# Patient Record
Sex: FEMALE | Race: WHITE | Employment: OTHER | URBAN - METROPOLITAN AREA
[De-identification: names, ages, dates, MRNs, and addresses within clinical notes are randomized per-mention and may not be internally consistent; named-entity substitution may affect disease eponyms.]

---

## 2017-04-18 ENCOUNTER — HOSPITAL ENCOUNTER (OUTPATIENT)
Dept: CT IMAGING | Age: 82
Discharge: HOME | End: 2017-04-18
Attending: INTERNAL MEDICINE | Admitting: INTERNAL MEDICINE

## 2017-04-18 DIAGNOSIS — F02.80 ALZHEIMER'S DISEASE OF OTHER ONSET: ICD-10-CM

## 2017-04-18 DIAGNOSIS — F02.818 DEMENTIA OF THE ALZHEIMER'S TYPE, WITH LATE ONSET, WITH DELUSIONS (HCC): ICD-10-CM

## 2017-04-18 DIAGNOSIS — G30.9 ALZHEIMER'S DISEASE (HCC): ICD-10-CM

## 2017-04-18 DIAGNOSIS — G30.8 ALZHEIMER'S DISEASE OF OTHER ONSET: ICD-10-CM

## 2017-04-18 DIAGNOSIS — G30.1 DEMENTIA OF THE ALZHEIMER'S TYPE, WITH LATE ONSET, WITH DELUSIONS (HCC): ICD-10-CM

## 2017-04-18 DIAGNOSIS — F02.80 ALZHEIMER'S DISEASE (HCC): ICD-10-CM

## 2019-06-25 ENCOUNTER — ANESTHESIA EVENT (OUTPATIENT)
Dept: OPERATING ROOM | Age: 84
DRG: 903 | End: 2019-06-25
Payer: MEDICARE

## 2019-06-25 ENCOUNTER — TELEPHONE (OUTPATIENT)
Dept: ORTHOPEDIC SURGERY | Age: 84
End: 2019-06-25

## 2019-06-25 ENCOUNTER — APPOINTMENT (OUTPATIENT)
Dept: GENERAL RADIOLOGY | Age: 84
DRG: 903 | End: 2019-06-25
Payer: MEDICARE

## 2019-06-25 ENCOUNTER — ANESTHESIA (OUTPATIENT)
Dept: OPERATING ROOM | Age: 84
DRG: 903 | End: 2019-06-25
Payer: MEDICARE

## 2019-06-25 ENCOUNTER — HOSPITAL ENCOUNTER (INPATIENT)
Age: 84
LOS: 2 days | Discharge: NURSING, REHAB OR LONG-TERM CARE | DRG: 903 | End: 2019-06-27
Attending: INTERNAL MEDICINE | Admitting: INTERNAL MEDICINE
Payer: MEDICARE

## 2019-06-25 VITALS
DIASTOLIC BLOOD PRESSURE: 63 MMHG | SYSTOLIC BLOOD PRESSURE: 114 MMHG | TEMPERATURE: 98.6 F | OXYGEN SATURATION: 100 % | RESPIRATION RATE: 13 BRPM

## 2019-06-25 DIAGNOSIS — S81.012A KNEE LACERATION, LEFT, INITIAL ENCOUNTER: Primary | ICD-10-CM

## 2019-06-25 PROBLEM — S81.002A OPEN KNEE WOUND, LEFT, INITIAL ENCOUNTER: Status: ACTIVE | Noted: 2019-06-25

## 2019-06-25 LAB
ANION GAP SERPL CALCULATED.3IONS-SCNC: 15 MMOL/L (ref 3–16)
APTT: 28.3 SEC (ref 26–36)
BASOPHILS ABSOLUTE: 0.1 K/UL (ref 0–0.2)
BASOPHILS RELATIVE PERCENT: 0.7 %
BUN BLDV-MCNC: 15 MG/DL (ref 7–20)
CALCIUM SERPL-MCNC: 9.5 MG/DL (ref 8.3–10.6)
CHLORIDE BLD-SCNC: 105 MMOL/L (ref 99–110)
CO2: 22 MMOL/L (ref 21–32)
CREAT SERPL-MCNC: 1 MG/DL (ref 0.6–1.2)
EKG ATRIAL RATE: 88 BPM
EKG DIAGNOSIS: NORMAL
EKG P AXIS: 43 DEGREES
EKG P-R INTERVAL: 140 MS
EKG Q-T INTERVAL: 370 MS
EKG QRS DURATION: 68 MS
EKG QTC CALCULATION (BAZETT): 447 MS
EKG R AXIS: -26 DEGREES
EKG T AXIS: 33 DEGREES
EKG VENTRICULAR RATE: 88 BPM
EOSINOPHILS ABSOLUTE: 0.1 K/UL (ref 0–0.6)
EOSINOPHILS RELATIVE PERCENT: 1.8 %
GFR AFRICAN AMERICAN: >60
GFR NON-AFRICAN AMERICAN: 52
GLUCOSE BLD-MCNC: 106 MG/DL (ref 70–99)
HCT VFR BLD CALC: 40.6 % (ref 36–48)
HEMOGLOBIN: 13.5 G/DL (ref 12–16)
INR BLD: 0.97 (ref 0.86–1.14)
LYMPHOCYTES ABSOLUTE: 1.2 K/UL (ref 1–5.1)
LYMPHOCYTES RELATIVE PERCENT: 15.6 %
MCH RBC QN AUTO: 30.3 PG (ref 26–34)
MCHC RBC AUTO-ENTMCNC: 33.1 G/DL (ref 31–36)
MCV RBC AUTO: 91.6 FL (ref 80–100)
MONOCYTES ABSOLUTE: 0.5 K/UL (ref 0–1.3)
MONOCYTES RELATIVE PERCENT: 6.6 %
NEUTROPHILS ABSOLUTE: 5.9 K/UL (ref 1.7–7.7)
NEUTROPHILS RELATIVE PERCENT: 75.3 %
PDW BLD-RTO: 15.1 % (ref 12.4–15.4)
PLATELET # BLD: 193 K/UL (ref 135–450)
PMV BLD AUTO: 8.7 FL (ref 5–10.5)
POTASSIUM REFLEX MAGNESIUM: 4.4 MMOL/L (ref 3.5–5.1)
PROTHROMBIN TIME: 11.1 SEC (ref 9.8–13)
RBC # BLD: 4.44 M/UL (ref 4–5.2)
SODIUM BLD-SCNC: 142 MMOL/L (ref 136–145)
WBC # BLD: 7.8 K/UL (ref 4–11)

## 2019-06-25 PROCEDURE — 99221 1ST HOSP IP/OBS SF/LOW 40: CPT | Performed by: NURSE PRACTITIONER

## 2019-06-25 PROCEDURE — 85730 THROMBOPLASTIN TIME PARTIAL: CPT

## 2019-06-25 PROCEDURE — 85610 PROTHROMBIN TIME: CPT

## 2019-06-25 PROCEDURE — 6360000002 HC RX W HCPCS: Performed by: NURSE ANESTHETIST, CERTIFIED REGISTERED

## 2019-06-25 PROCEDURE — 2709999900 HC NON-CHARGEABLE SUPPLY: Performed by: ORTHOPAEDIC SURGERY

## 2019-06-25 PROCEDURE — 2500000003 HC RX 250 WO HCPCS: Performed by: NURSE ANESTHETIST, CERTIFIED REGISTERED

## 2019-06-25 PROCEDURE — 2580000003 HC RX 258: Performed by: NURSE ANESTHETIST, CERTIFIED REGISTERED

## 2019-06-25 PROCEDURE — 13122 CMPLX RPR S/A/L ADDL 5 CM/>: CPT | Performed by: ORTHOPAEDIC SURGERY

## 2019-06-25 PROCEDURE — 2580000003 HC RX 258: Performed by: INTERNAL MEDICINE

## 2019-06-25 PROCEDURE — 7100000001 HC PACU RECOVERY - ADDTL 15 MIN: Performed by: ORTHOPAEDIC SURGERY

## 2019-06-25 PROCEDURE — 6360000002 HC RX W HCPCS: Performed by: NURSE PRACTITIONER

## 2019-06-25 PROCEDURE — 3700000001 HC ADD 15 MINUTES (ANESTHESIA): Performed by: ORTHOPAEDIC SURGERY

## 2019-06-25 PROCEDURE — 99285 EMERGENCY DEPT VISIT HI MDM: CPT

## 2019-06-25 PROCEDURE — 0JBP0ZZ EXCISION OF LEFT LOWER LEG SUBCUTANEOUS TISSUE AND FASCIA, OPEN APPROACH: ICD-10-PCS | Performed by: INTERNAL MEDICINE

## 2019-06-25 PROCEDURE — 90471 IMMUNIZATION ADMIN: CPT | Performed by: PHYSICIAN ASSISTANT

## 2019-06-25 PROCEDURE — 2500000003 HC RX 250 WO HCPCS: Performed by: ORTHOPAEDIC SURGERY

## 2019-06-25 PROCEDURE — 6360000002 HC RX W HCPCS: Performed by: PHYSICIAN ASSISTANT

## 2019-06-25 PROCEDURE — 7100000000 HC PACU RECOVERY - FIRST 15 MIN: Performed by: ORTHOPAEDIC SURGERY

## 2019-06-25 PROCEDURE — 1200000000 HC SEMI PRIVATE

## 2019-06-25 PROCEDURE — 13121 CMPLX RPR S/A/L 2.6-7.5 CM: CPT | Performed by: ORTHOPAEDIC SURGERY

## 2019-06-25 PROCEDURE — 73560 X-RAY EXAM OF KNEE 1 OR 2: CPT

## 2019-06-25 PROCEDURE — 6360000002 HC RX W HCPCS: Performed by: ORTHOPAEDIC SURGERY

## 2019-06-25 PROCEDURE — 2580000003 HC RX 258: Performed by: ORTHOPAEDIC SURGERY

## 2019-06-25 PROCEDURE — 90715 TDAP VACCINE 7 YRS/> IM: CPT | Performed by: PHYSICIAN ASSISTANT

## 2019-06-25 PROCEDURE — 6370000000 HC RX 637 (ALT 250 FOR IP): Performed by: INTERNAL MEDICINE

## 2019-06-25 PROCEDURE — 96374 THER/PROPH/DIAG INJ IV PUSH: CPT

## 2019-06-25 PROCEDURE — 6370000000 HC RX 637 (ALT 250 FOR IP): Performed by: ORTHOPAEDIC SURGERY

## 2019-06-25 PROCEDURE — 93010 ELECTROCARDIOGRAM REPORT: CPT | Performed by: INTERNAL MEDICINE

## 2019-06-25 PROCEDURE — 0JCP0ZZ EXTIRPATION OF MATTER FROM LEFT LOWER LEG SUBCUTANEOUS TISSUE AND FASCIA, OPEN APPROACH: ICD-10-PCS | Performed by: INTERNAL MEDICINE

## 2019-06-25 PROCEDURE — 93005 ELECTROCARDIOGRAM TRACING: CPT | Performed by: PHYSICIAN ASSISTANT

## 2019-06-25 PROCEDURE — 3600000003 HC SURGERY LEVEL 3 BASE: Performed by: ORTHOPAEDIC SURGERY

## 2019-06-25 PROCEDURE — 3600000013 HC SURGERY LEVEL 3 ADDTL 15MIN: Performed by: ORTHOPAEDIC SURGERY

## 2019-06-25 PROCEDURE — 85025 COMPLETE CBC W/AUTO DIFF WBC: CPT

## 2019-06-25 PROCEDURE — 80048 BASIC METABOLIC PNL TOTAL CA: CPT

## 2019-06-25 PROCEDURE — 0JQP0ZZ REPAIR LEFT LOWER LEG SUBCUTANEOUS TISSUE AND FASCIA, OPEN APPROACH: ICD-10-PCS | Performed by: INTERNAL MEDICINE

## 2019-06-25 PROCEDURE — 3700000000 HC ANESTHESIA ATTENDED CARE: Performed by: ORTHOPAEDIC SURGERY

## 2019-06-25 RX ORDER — CALCIUM CARBONATE 500(1250)
500 TABLET ORAL DAILY
COMMUNITY
End: 2022-03-09

## 2019-06-25 RX ORDER — SODIUM CHLORIDE, SODIUM LACTATE, POTASSIUM CHLORIDE, CALCIUM CHLORIDE 600; 310; 30; 20 MG/100ML; MG/100ML; MG/100ML; MG/100ML
INJECTION, SOLUTION INTRAVENOUS CONTINUOUS
Status: DISCONTINUED | OUTPATIENT
Start: 2019-06-25 | End: 2019-06-25

## 2019-06-25 RX ORDER — FENTANYL CITRATE 50 UG/ML
25 INJECTION, SOLUTION INTRAMUSCULAR; INTRAVENOUS EVERY 5 MIN PRN
Status: DISCONTINUED | OUTPATIENT
Start: 2019-06-25 | End: 2019-06-25 | Stop reason: HOSPADM

## 2019-06-25 RX ORDER — MORPHINE SULFATE 2 MG/ML
1 INJECTION, SOLUTION INTRAMUSCULAR; INTRAVENOUS
Status: DISCONTINUED | OUTPATIENT
Start: 2019-06-25 | End: 2019-06-27 | Stop reason: HOSPADM

## 2019-06-25 RX ORDER — AMLODIPINE BESYLATE 5 MG/1
2.5 TABLET ORAL NIGHTLY
Status: DISCONTINUED | OUTPATIENT
Start: 2019-06-25 | End: 2019-06-27 | Stop reason: HOSPADM

## 2019-06-25 RX ORDER — BUPIVACAINE HYDROCHLORIDE 5 MG/ML
INJECTION, SOLUTION EPIDURAL; INTRACAUDAL
Status: COMPLETED | OUTPATIENT
Start: 2019-06-25 | End: 2019-06-25

## 2019-06-25 RX ORDER — SODIUM CHLORIDE 0.9 % (FLUSH) 0.9 %
10 SYRINGE (ML) INJECTION PRN
Status: DISCONTINUED | OUTPATIENT
Start: 2019-06-25 | End: 2019-06-27 | Stop reason: HOSPADM

## 2019-06-25 RX ORDER — OXYCODONE HYDROCHLORIDE AND ACETAMINOPHEN 5; 325 MG/1; MG/1
1 TABLET ORAL PRN
Status: DISCONTINUED | OUTPATIENT
Start: 2019-06-25 | End: 2019-06-25 | Stop reason: HOSPADM

## 2019-06-25 RX ORDER — ONDANSETRON 2 MG/ML
4 INJECTION INTRAMUSCULAR; INTRAVENOUS EVERY 6 HOURS PRN
Status: DISCONTINUED | OUTPATIENT
Start: 2019-06-25 | End: 2019-06-27 | Stop reason: HOSPADM

## 2019-06-25 RX ORDER — SODIUM CHLORIDE 0.9 % (FLUSH) 0.9 %
10 SYRINGE (ML) INJECTION EVERY 12 HOURS SCHEDULED
Status: DISCONTINUED | OUTPATIENT
Start: 2019-06-25 | End: 2019-06-25

## 2019-06-25 RX ORDER — LISINOPRIL 10 MG/1
10 TABLET ORAL DAILY
Status: DISCONTINUED | OUTPATIENT
Start: 2019-06-25 | End: 2019-06-26

## 2019-06-25 RX ORDER — SODIUM CHLORIDE 0.9 % (FLUSH) 0.9 %
10 SYRINGE (ML) INJECTION EVERY 12 HOURS SCHEDULED
Status: DISCONTINUED | OUTPATIENT
Start: 2019-06-25 | End: 2019-06-27 | Stop reason: HOSPADM

## 2019-06-25 RX ORDER — SUCCINYLCHOLINE/SOD CL,ISO/PF 200MG/10ML
SYRINGE (ML) INTRAVENOUS PRN
Status: DISCONTINUED | OUTPATIENT
Start: 2019-06-25 | End: 2019-06-25 | Stop reason: SDUPTHER

## 2019-06-25 RX ORDER — PHENYLEPHRINE HCL IN 0.9% NACL 1 MG/10 ML
SYRINGE (ML) INTRAVENOUS PRN
Status: DISCONTINUED | OUTPATIENT
Start: 2019-06-25 | End: 2019-06-25 | Stop reason: SDUPTHER

## 2019-06-25 RX ORDER — OXYCODONE HYDROCHLORIDE AND ACETAMINOPHEN 5; 325 MG/1; MG/1
2 TABLET ORAL PRN
Status: DISCONTINUED | OUTPATIENT
Start: 2019-06-25 | End: 2019-06-25 | Stop reason: HOSPADM

## 2019-06-25 RX ORDER — LIDOCAINE HYDROCHLORIDE 20 MG/ML
INJECTION, SOLUTION EPIDURAL; INFILTRATION; INTRACAUDAL; PERINEURAL PRN
Status: DISCONTINUED | OUTPATIENT
Start: 2019-06-25 | End: 2019-06-25 | Stop reason: SDUPTHER

## 2019-06-25 RX ORDER — PROPOFOL 10 MG/ML
INJECTION, EMULSION INTRAVENOUS PRN
Status: DISCONTINUED | OUTPATIENT
Start: 2019-06-25 | End: 2019-06-25 | Stop reason: SDUPTHER

## 2019-06-25 RX ORDER — ACETAMINOPHEN 325 MG/1
650 TABLET ORAL EVERY 6 HOURS PRN
Status: DISCONTINUED | OUTPATIENT
Start: 2019-06-25 | End: 2019-06-27 | Stop reason: HOSPADM

## 2019-06-25 RX ORDER — SODIUM CHLORIDE 450 MG/100ML
INJECTION, SOLUTION INTRAVENOUS CONTINUOUS
Status: DISCONTINUED | OUTPATIENT
Start: 2019-06-25 | End: 2019-06-26

## 2019-06-25 RX ORDER — ONDANSETRON 2 MG/ML
4 INJECTION INTRAMUSCULAR; INTRAVENOUS
Status: DISCONTINUED | OUTPATIENT
Start: 2019-06-25 | End: 2019-06-25 | Stop reason: HOSPADM

## 2019-06-25 RX ORDER — DEXAMETHASONE SODIUM PHOSPHATE 4 MG/ML
INJECTION, SOLUTION INTRA-ARTICULAR; INTRALESIONAL; INTRAMUSCULAR; INTRAVENOUS; SOFT TISSUE PRN
Status: DISCONTINUED | OUTPATIENT
Start: 2019-06-25 | End: 2019-06-25 | Stop reason: SDUPTHER

## 2019-06-25 RX ORDER — SODIUM CHLORIDE 9 MG/ML
INJECTION, SOLUTION INTRAVENOUS CONTINUOUS PRN
Status: DISCONTINUED | OUTPATIENT
Start: 2019-06-25 | End: 2019-06-25 | Stop reason: SDUPTHER

## 2019-06-25 RX ORDER — FENTANYL CITRATE 50 UG/ML
INJECTION, SOLUTION INTRAMUSCULAR; INTRAVENOUS PRN
Status: DISCONTINUED | OUTPATIENT
Start: 2019-06-25 | End: 2019-06-25 | Stop reason: SDUPTHER

## 2019-06-25 RX ORDER — DOCUSATE SODIUM 100 MG/1
100 CAPSULE, LIQUID FILLED ORAL 2 TIMES DAILY
Status: DISCONTINUED | OUTPATIENT
Start: 2019-06-25 | End: 2019-06-27 | Stop reason: HOSPADM

## 2019-06-25 RX ORDER — SODIUM CHLORIDE 0.9 % (FLUSH) 0.9 %
10 SYRINGE (ML) INJECTION PRN
Status: DISCONTINUED | OUTPATIENT
Start: 2019-06-25 | End: 2019-06-25

## 2019-06-25 RX ORDER — CHOLECALCIFEROL (VITAMIN D3) 125 MCG
500 CAPSULE ORAL DAILY
COMMUNITY

## 2019-06-25 RX ORDER — MORPHINE SULFATE 2 MG/ML
4 INJECTION, SOLUTION INTRAMUSCULAR; INTRAVENOUS ONCE
Status: COMPLETED | OUTPATIENT
Start: 2019-06-25 | End: 2019-06-25

## 2019-06-25 RX ORDER — ONDANSETRON 2 MG/ML
INJECTION INTRAMUSCULAR; INTRAVENOUS PRN
Status: DISCONTINUED | OUTPATIENT
Start: 2019-06-25 | End: 2019-06-25 | Stop reason: SDUPTHER

## 2019-06-25 RX ORDER — SIMVASTATIN 20 MG
20 TABLET ORAL NIGHTLY
Status: DISCONTINUED | OUTPATIENT
Start: 2019-06-25 | End: 2019-06-27 | Stop reason: HOSPADM

## 2019-06-25 RX ADMIN — LISINOPRIL 10 MG: 10 TABLET ORAL at 19:00

## 2019-06-25 RX ADMIN — LIDOCAINE HYDROCHLORIDE 60 MG: 20 INJECTION, SOLUTION EPIDURAL; INFILTRATION; INTRACAUDAL; PERINEURAL at 16:50

## 2019-06-25 RX ADMIN — Medication 80 MG: at 16:50

## 2019-06-25 RX ADMIN — PROPOFOL 70 MG: 10 INJECTION, EMULSION INTRAVENOUS at 16:50

## 2019-06-25 RX ADMIN — LABETALOL HYDROCHLORIDE 300 MG: 200 TABLET, FILM COATED ORAL at 20:57

## 2019-06-25 RX ADMIN — AMLODIPINE BESYLATE 2.5 MG: 5 TABLET ORAL at 20:58

## 2019-06-25 RX ADMIN — ONDANSETRON 4 MG: 2 INJECTION INTRAMUSCULAR; INTRAVENOUS at 16:56

## 2019-06-25 RX ADMIN — FENTANYL CITRATE 50 MCG: 50 INJECTION INTRAMUSCULAR; INTRAVENOUS at 16:50

## 2019-06-25 RX ADMIN — SODIUM CHLORIDE: 4.5 INJECTION, SOLUTION INTRAVENOUS at 21:04

## 2019-06-25 RX ADMIN — FENTANYL CITRATE 50 MCG: 50 INJECTION INTRAMUSCULAR; INTRAVENOUS at 16:59

## 2019-06-25 RX ADMIN — Medication 2 G: at 16:45

## 2019-06-25 RX ADMIN — MORPHINE SULFATE 4 MG: 2 INJECTION, SOLUTION INTRAMUSCULAR; INTRAVENOUS at 15:20

## 2019-06-25 RX ADMIN — Medication 100 MCG: at 17:05

## 2019-06-25 RX ADMIN — SIMVASTATIN 20 MG: 20 TABLET, FILM COATED ORAL at 20:57

## 2019-06-25 RX ADMIN — SODIUM CHLORIDE, PRESERVATIVE FREE 10 ML: 5 INJECTION INTRAVENOUS at 21:08

## 2019-06-25 RX ADMIN — SODIUM CHLORIDE: 9 INJECTION, SOLUTION INTRAVENOUS at 16:34

## 2019-06-25 RX ADMIN — DOCUSATE SODIUM 100 MG: 100 CAPSULE, LIQUID FILLED ORAL at 20:58

## 2019-06-25 RX ADMIN — DEXAMETHASONE SODIUM PHOSPHATE 8 MG: 4 INJECTION, SOLUTION INTRAMUSCULAR; INTRAVENOUS at 16:56

## 2019-06-25 RX ADMIN — TETANUS TOXOID, REDUCED DIPHTHERIA TOXOID AND ACELLULAR PERTUSSIS VACCINE, ADSORBED 0.5 ML: 5; 2.5; 8; 8; 2.5 SUSPENSION INTRAMUSCULAR at 14:36

## 2019-06-25 ASSESSMENT — PULMONARY FUNCTION TESTS
PIF_VALUE: 18
PIF_VALUE: 14
PIF_VALUE: 18
PIF_VALUE: 18
PIF_VALUE: 16
PIF_VALUE: 26
PIF_VALUE: 16
PIF_VALUE: 18
PIF_VALUE: 18
PIF_VALUE: 0
PIF_VALUE: 4
PIF_VALUE: 18
PIF_VALUE: 18
PIF_VALUE: 35
PIF_VALUE: 18
PIF_VALUE: 18
PIF_VALUE: 15
PIF_VALUE: 15
PIF_VALUE: 18
PIF_VALUE: 15
PIF_VALUE: 16
PIF_VALUE: 18
PIF_VALUE: 15
PIF_VALUE: 14
PIF_VALUE: 1
PIF_VALUE: 16
PIF_VALUE: 18
PIF_VALUE: 1
PIF_VALUE: 18
PIF_VALUE: 2
PIF_VALUE: 19
PIF_VALUE: 18
PIF_VALUE: 3
PIF_VALUE: 25
PIF_VALUE: 18
PIF_VALUE: 15
PIF_VALUE: 18
PIF_VALUE: 14
PIF_VALUE: 18
PIF_VALUE: 3
PIF_VALUE: 18
PIF_VALUE: 5

## 2019-06-25 ASSESSMENT — PAIN DESCRIPTION - ORIENTATION: ORIENTATION: LEFT

## 2019-06-25 ASSESSMENT — PAIN DESCRIPTION - DESCRIPTORS
DESCRIPTORS: ACHING;CONSTANT
DESCRIPTORS: ACHING;CONSTANT

## 2019-06-25 ASSESSMENT — PAIN - FUNCTIONAL ASSESSMENT
PAIN_FUNCTIONAL_ASSESSMENT: 0-10
PAIN_FUNCTIONAL_ASSESSMENT: INTOLERABLE, UNABLE TO DO ANY ACTIVE OR PASSIVE ACTIVITIES

## 2019-06-25 ASSESSMENT — ENCOUNTER SYMPTOMS
NAUSEA: 0
CHEST TIGHTNESS: 0
SHORTNESS OF BREATH: 0
VOMITING: 0

## 2019-06-25 ASSESSMENT — PAIN SCALES - GENERAL
PAINLEVEL_OUTOF10: 0
PAINLEVEL_OUTOF10: 10
PAINLEVEL_OUTOF10: 0
PAINLEVEL_OUTOF10: 10

## 2019-06-25 ASSESSMENT — PAIN DESCRIPTION - LOCATION: LOCATION: KNEE

## 2019-06-25 ASSESSMENT — PAIN DESCRIPTION - PROGRESSION: CLINICAL_PROGRESSION: NOT CHANGED

## 2019-06-25 ASSESSMENT — PAIN DESCRIPTION - FREQUENCY: FREQUENCY: CONTINUOUS

## 2019-06-25 ASSESSMENT — PAIN DESCRIPTION - PAIN TYPE: TYPE: ACUTE PAIN

## 2019-06-25 ASSESSMENT — PAIN DESCRIPTION - ONSET: ONSET: SUDDEN

## 2019-06-25 NOTE — TELEPHONE ENCOUNTER
[facility] Call  New knee laceration  Bed 10  Nurse [address] [facility]  [phone]  CC/FAVIO

## 2019-06-25 NOTE — ED NOTES
Bed: 10  Expected date: 6/25/19  Expected time: 2:00 PM  Means of arrival: [facility]  Comments:  80 year female with knee laceration     Malinda Gomez  06/25/19 1839

## 2019-06-25 NOTE — PROGRESS NOTES
4 Eyes Skin Assessment     The patient is being assess for  Admission    I agree that 2 RN's have performed a thorough Head to Toe Skin Assessment on the patient. ALL assessment sites listed below have been assessed. Areas assessed by both nurses: yes  [x]   Head, Face, and Ears   [x]   Shoulders, Back, and Chest  [x]   Arms, Elbows, and Hands   [x]   Coccyx, Sacrum, and IschIum  [x]   Legs, Feet, and Heels        Does the Patient have Skin Breakdown?   No         Song Prevention initiated:  NA   Wound Care Orders initiated:  NA      [facility] nurse consulted for Pressure Injury (Stage 3,4, Unstageable, DTI, NWPT, and Complex wounds), New and Established Ostomies:  NA      Nurse 1 eSignature: Electronically signed by Yesi Orr RN on 6/25/19 at 7:12 PM    **SHARE this note so that the co-signing nurse is able to place an eSignature**    Nurse 2 eSignature: Electronically signed by Marie Reilly RN on 6/25/19 at 7:12pm

## 2019-06-25 NOTE — LETTER
2019    [facility]  Consult Billing Form:      DEMOGRAPHICS:                                                                                                              .    Patient Name:  Michael Gloria  Patient :  1927   Patient SS#:  [id number]    Patient Phone:  [phone] (home)  Alt. Patient Phone:    Patient Address:  [place] [address] [place] [address]    PCP:  Kari Segura MD  Insurance:  Payor: MEDICARE / Plan: RAILROAD MEDICARE / Product Type: *No Product type* /   Insurance ID Number:    DIAGNOSIS & PROCEDURE:                                                                                            .    Diagnosis:   Left knee laceration    Hospital:  [facility]    Provider:  Harvey BLOUNT    SCHEDULING INFORMATION:                                                                                         .     Date of Consultation:                              Harvey BLOUNT  19     BILLING INFORMATION:                                                                                                    .    Procedure:       CPT Code Modifier

## 2019-06-25 NOTE — ANESTHESIA PRE PROCEDURE
[facility] Department of Anesthesiology  Pre-Anesthesia Evaluation/Consultation       Name:  Laura Ngo  : 1927  Age:  80 y.o. MRN:  [id number]  Date: 2019           Surgeon: Surgeon(s):  Wilberto Youngblood MD    Procedure: Procedure(s):  INCISION AND DRAINAGE LEFT KNEE     No Known Allergies  Patient Active Problem List   Diagnosis    Spigelian hernia with bowel obstruction    Knee laceration, left, initial encounter     Past Medical History:   Diagnosis Date    Arthritis     Hyperlipidemia     Hypertension      Past Surgical History:   Procedure Laterality Date    CHOLECYSTECTOMY      INGUINAL HERNIA REPAIR Right 2016    incarcerated spigelian IHR with mesh     Social History     Tobacco Use    Smoking status: Never Smoker    Smokeless tobacco: Never Used   Substance Use Topics    Alcohol use: Yes     Comment: occ    Drug use: No     Medications  No current facility-administered medications on file prior to encounter.       Current Outpatient Medications on File Prior to Encounter   Medication Sig Dispense Refill    sertraline (ZOLOFT) 50 MG tablet Take 50 mg by mouth nightly       vitamin B-12 (CYANOCOBALAMIN) 500 MCG tablet Take 500 mcg by mouth daily      aspirin 81 MG tablet Take 81 mg by mouth daily      calcium carbonate (OSCAL) 500 MG TABS tablet Take 500 mg by mouth daily      benazepril (LOTENSIN) 40 MG tablet Take 40 mg by mouth daily       amLODIPine (NORVASC) 5 MG tablet Take 5 mg by mouth daily        Current Facility-Administered Medications   Medication Dose Route Frequency Provider Last Rate Last Dose    ceFAZolin (ANCEF) 2 g in dextrose 5 % 100 mL IVPB  2 g Intravenous Once RODRIGO Murguia - CNP         Current Outpatient Medications   Medication Sig Dispense Refill    sertraline (ZOLOFT) 50 MG tablet Take 50 mg by mouth nightly       vitamin B-12 (CYANOCOBALAMIN) 500 MCG tablet Take 500 mcg by mouth daily      aspirin 81 MG tablet Take 81 mg by mouth daily      calcium carbonate (OSCAL) 500 MG TABS tablet Take 500 mg by mouth daily      benazepril (LOTENSIN) 40 MG tablet Take 40 mg by mouth daily       amLODIPine (NORVASC) 5 MG tablet Take 5 mg by mouth daily        Vital Signs (Current)   Vitals:    19 1413 19 1540   BP: (!) 156/89 (!) 112/94   Pulse: 75    Resp: 16    Temp: 97.4 °F (36.3 °C)    TempSrc: Oral    SpO2: 97% 100%   Weight: 166 lb 3.6 oz (75.4 kg)                                           BP Readings from Last 3 Encounters:   19 (!) 112/94   16 144     Vital Signs Statistics (for past 48 hrs)     Temp  Av.4 °F (36.3 °C)  Min: 97.4 °F (36.3 °C)   Min taken time: 19 1413  Max: 97.4 °F (36.3 °C)   Max taken time: 19 1413  Pulse  Av  Min: 75   Min taken time: 19 1413  Max: 76   Max taken time: 19 1413  Resp  Av  Min: 16   Min taken time: 19 1413  Max: 12   Max taken time: 19 1413  BP  Min: 112/94   Min taken time: 19 1540  Max: 156/89   Max taken time: 19 1413  SpO2  Av.5 %  Min: 97 %   Min taken time: 19 1413  Max: 100 %   Max taken time: 19 1540  BP Readings from Last 3 Encounters:   19 (!) 112/94   16 144       BMI  Body mass index is 27.66 kg/m². Estimated body mass index is 27.66 kg/m² as calculated from the following:    Height as of 6/10/16: 5' 5\" (1.651 m). Weight as of this encounter: 166 lb 3.6 oz (75.4 kg).     CBC   Lab Results   Component Value Date    WBC 7.8 2019    RBC 4.44 2019    HGB 13.5 2019    HCT 40.6 2019    MCV 91.6 2019    RDW 15.1 2019     2019     CMP    Lab Results   Component Value Date     2019    K 4.4 2019     2019    CO2 22 2019    BUN 15 2019    CREATININE 1.0 2019    GFRAA >60 2019    GFRAA 55 2013    AGRATIO 1.7 2016 pre-anesthesia assessment may be used as a history and physical.    DOS STAFF ADDENDUM:    Pt seen and examined, chart reviewed (including anesthesia, drug and allergy history). No interval changes to history and physical examination. Anesthetic plan, risks, benefits, alternatives, and personnel involved discussed with patient. Patient verbalized an understanding and agrees to proceed.       Juan Covington MD  June 25, 2019  3:54 PM

## 2019-06-25 NOTE — PROGRESS NOTES
Pt to 3128 f/pacu. Report received f/Jazmyn. Pt is alert and oriented X4. Pt oriented to unit and to room. Pt oriented to call light and to phone. White board updated. Left knee immobilizer intact, ace on left knee D&I. VSS. Respirations easy/even. Pt denies any further needs at this time. Family at bedside. Will continue to monitor and assess.  Electronically signed by Hannah Dixon RN on 6/25/2019 at 7:11 PM

## 2019-06-26 LAB
BASOPHILS ABSOLUTE: 0 K/UL (ref 0–0.2)
BASOPHILS RELATIVE PERCENT: 0.1 %
EOSINOPHILS ABSOLUTE: 0 K/UL (ref 0–0.6)
EOSINOPHILS RELATIVE PERCENT: 0 %
HCT VFR BLD CALC: 30 % (ref 36–48)
HEMOGLOBIN: 9.8 G/DL (ref 12–16)
LYMPHOCYTES ABSOLUTE: 0.6 K/UL (ref 1–5.1)
LYMPHOCYTES RELATIVE PERCENT: 6.2 %
MCH RBC QN AUTO: 30.6 PG (ref 26–34)
MCHC RBC AUTO-ENTMCNC: 32.8 G/DL (ref 31–36)
MCV RBC AUTO: 93.4 FL (ref 80–100)
MONOCYTES ABSOLUTE: 0.3 K/UL (ref 0–1.3)
MONOCYTES RELATIVE PERCENT: 3.1 %
NEUTROPHILS ABSOLUTE: 9.3 K/UL (ref 1.7–7.7)
NEUTROPHILS RELATIVE PERCENT: 90.6 %
PDW BLD-RTO: 15.1 % (ref 12.4–15.4)
PLATELET # BLD: 174 K/UL (ref 135–450)
PMV BLD AUTO: 8.9 FL (ref 5–10.5)
RBC # BLD: 3.21 M/UL (ref 4–5.2)
WBC # BLD: 10.3 K/UL (ref 4–11)

## 2019-06-26 PROCEDURE — 36415 COLL VENOUS BLD VENIPUNCTURE: CPT

## 2019-06-26 PROCEDURE — 97166 OT EVAL MOD COMPLEX 45 MIN: CPT

## 2019-06-26 PROCEDURE — 6370000000 HC RX 637 (ALT 250 FOR IP): Performed by: ORTHOPAEDIC SURGERY

## 2019-06-26 PROCEDURE — 85025 COMPLETE CBC W/AUTO DIFF WBC: CPT

## 2019-06-26 PROCEDURE — 2580000003 HC RX 258: Performed by: ORTHOPAEDIC SURGERY

## 2019-06-26 PROCEDURE — 6360000002 HC RX W HCPCS: Performed by: ORTHOPAEDIC SURGERY

## 2019-06-26 PROCEDURE — 97530 THERAPEUTIC ACTIVITIES: CPT

## 2019-06-26 PROCEDURE — 97535 SELF CARE MNGMENT TRAINING: CPT

## 2019-06-26 PROCEDURE — 2580000003 HC RX 258: Performed by: INTERNAL MEDICINE

## 2019-06-26 PROCEDURE — 6360000002 HC RX W HCPCS: Performed by: INTERNAL MEDICINE

## 2019-06-26 PROCEDURE — 6370000000 HC RX 637 (ALT 250 FOR IP): Performed by: INTERNAL MEDICINE

## 2019-06-26 PROCEDURE — 97116 GAIT TRAINING THERAPY: CPT

## 2019-06-26 PROCEDURE — 97162 PT EVAL MOD COMPLEX 30 MIN: CPT

## 2019-06-26 PROCEDURE — 1200000000 HC SEMI PRIVATE

## 2019-06-26 PROCEDURE — 94760 N-INVAS EAR/PLS OXIMETRY 1: CPT

## 2019-06-26 RX ORDER — ACETAMINOPHEN 325 MG/1
650 TABLET ORAL 2 TIMES DAILY PRN
Qty: 40 TABLET | Refills: 0 | Status: ON HOLD | OUTPATIENT
Start: 2019-06-26 | End: 2019-07-03 | Stop reason: HOSPADM

## 2019-06-26 RX ORDER — 0.9 % SODIUM CHLORIDE 0.9 %
500 INTRAVENOUS SOLUTION INTRAVENOUS ONCE
Status: COMPLETED | OUTPATIENT
Start: 2019-06-26 | End: 2019-06-26

## 2019-06-26 RX ORDER — ASPIRIN 81 MG/1
81 TABLET ORAL 2 TIMES DAILY
Qty: 28 TABLET | Refills: 0 | Status: SHIPPED | OUTPATIENT
Start: 2019-06-26 | End: 2019-07-10

## 2019-06-26 RX ORDER — SODIUM CHLORIDE 9 MG/ML
INJECTION, SOLUTION INTRAVENOUS CONTINUOUS
Status: DISCONTINUED | OUTPATIENT
Start: 2019-06-26 | End: 2019-06-27 | Stop reason: HOSPADM

## 2019-06-26 RX ADMIN — SIMVASTATIN 20 MG: 20 TABLET, FILM COATED ORAL at 20:35

## 2019-06-26 RX ADMIN — DOCUSATE SODIUM 100 MG: 100 CAPSULE, LIQUID FILLED ORAL at 08:55

## 2019-06-26 RX ADMIN — ENOXAPARIN SODIUM 40 MG: 40 INJECTION SUBCUTANEOUS at 08:55

## 2019-06-26 RX ADMIN — SODIUM CHLORIDE: 9 INJECTION, SOLUTION INTRAVENOUS at 11:56

## 2019-06-26 RX ADMIN — DOCUSATE SODIUM 100 MG: 100 CAPSULE, LIQUID FILLED ORAL at 20:35

## 2019-06-26 RX ADMIN — SODIUM CHLORIDE 500 ML: 9 INJECTION, SOLUTION INTRAVENOUS at 11:56

## 2019-06-26 RX ADMIN — SODIUM CHLORIDE: 9 INJECTION, SOLUTION INTRAVENOUS at 20:35

## 2019-06-26 RX ADMIN — Medication 2 G: at 00:22

## 2019-06-26 RX ADMIN — SODIUM CHLORIDE, PRESERVATIVE FREE 10 ML: 5 INJECTION INTRAVENOUS at 20:35

## 2019-06-26 RX ADMIN — SODIUM CHLORIDE: 4.5 INJECTION, SOLUTION INTRAVENOUS at 08:55

## 2019-06-26 RX ADMIN — SODIUM CHLORIDE, PRESERVATIVE FREE 10 ML: 5 INJECTION INTRAVENOUS at 08:55

## 2019-06-26 RX ADMIN — Medication 2 G: at 08:55

## 2019-06-26 ASSESSMENT — PAIN SCALES - GENERAL
PAINLEVEL_OUTOF10: 0

## 2019-06-26 NOTE — PROGRESS NOTES
(has knee immobilizer on)  Orientation  Orientation  Overall Orientation Status: Within Functional Limits  Social/Functional History  Social/Functional History  Lives With: Alone  Type of Home: ([address] [facility] )  Home Layout: One level  Home Access: Level entry  Bathroom Shower/Tub: Walk-in shower, Shower chair with back  Bathroom Toilet: Handicap height  Bathroom Equipment: Grab bars in shower, Grab bars around toilet  Bathroom Accessibility: Walker accessible  Home Equipment: Rolling walker, 4 wheeled walker  ADL Assistance: Independent  Homemaking Assistance: (Meals in dining room, assist with cleaning - pt does laundry )  Ambulation Assistance: Independent(RW when traveling out, 4WW in facility )  Transfer Assistance: Independent  Active : No  Leisure & Hobbies: BINGO   Additional Comments: Denies other recent falls   Objective  ROM and strength in uninvolved limbs wfl  Motor Control  Gross Motor?: WFL  Sensation  Overall Sensation Status: WFL  Bed mobility  Supine to Sit: Stand by assistance  Sit to Supine: Unable to assess  Transfers  Sit to Stand: Contact guard assistance  Stand to sit: Contact guard assistance;Minimal Assistance  Ambulation  Ambulation?: Yes  Ambulation 1  Surface: level tile  Device: Rolling Walker  Assistance: Contact guard assistance  Quality of Gait: step to progressing to step through with left knee extended in the knee immobilizer  Distance: in room for ~30 feet   Stairs/Curb  Stairs?: No     Balance  Comments: midline in sitting and static stance on the walker     -dynamic is fair plus on rolling walker in this first and limited observation  Exercises  Ankle Pumps: 30 per hour      Plan   Plan  Times per week: 1-4 sessions  Current Treatment Recommendations: Functional Mobility Training, Transfer Training, Gait Training, Modalities, Positioning, Patient/Caregiver Education & Training, ADL/Self-care Training  Safety Devices  Type of devices:  All fall risk precautions in place, Call light within reach, Chair alarm in place, Left in chair, Nurse notified(Marisol)    AM-PAC Score  AM-PAC Inpatient Mobility Raw Score : 16 (06/26/19 0846)  AM-PAC Inpatient T-Scale Score : 40.78 (06/26/19 0846)  Mobility Inpatient CMS 0-100% Score: 54.16 (06/26/19 0846)  Mobility Inpatient CMS G-Code Modifier : CK (06/26/19 0846)    Goals  Short term goals  Time Frame for Short term goals: 1-2 days   Short term goal 1: bed mobility at sba   Short term goal 2: transfers at [employer] term goal 3: ambulation at SBA/CGA wbat rolling walker for 40-50 feet   Patient Goals   Patient goals : get well       Therapy Time   Individual Concurrent Group Co-treatment   Time In 0805         Time Out 0845         Minutes 111 Calvin Hernandez, PT

## 2019-06-26 NOTE — H&P
Hospital Medicine History & Physical      PCP: Sonny Pinon MD    Date of Admission: 6/25/2019    Date of Service: Pt seen/examined on 6.25.19  and Admitted to Inpatient with expected LOS greater than two midnights due to medical therapy. .    Chief Complaint:  Left knee pain       History Of Present Illness:   80 y.o. female who presented to [facility] with fall. Pt slipped and fell and landed on her knee pain. Had pain and felt her knee area split open  Came to ED and found to have large open wound laceration  Taken urgently to OR for I and D by orthopedics  Seen by me after procedure has been done. She admits to paper thin skin  Denied syncope leading to fall. Past Medical History:          Diagnosis Date    Arthritis     Hyperlipidemia     Hypertension        Past Surgical History:          Procedure Laterality Date    CHOLECYSTECTOMY      INGUINAL HERNIA REPAIR Right 06/09/2016    incarcerated spigelian IHR with mesh       Medications Prior to Admission:      Prior to Admission medications    Medication Sig Start Date End Date Taking? Authorizing Provider   sertraline (ZOLOFT) 50 MG tablet Take 50 mg by mouth nightly    Yes Historical Provider, MD   vitamin B-12 (CYANOCOBALAMIN) 500 MCG tablet Take 500 mcg by mouth daily   Yes Historical Provider, MD   aspirin 81 MG tablet Take 81 mg by mouth daily   Yes Historical Provider, MD   calcium carbonate (OSCAL) 500 MG TABS tablet Take 500 mg by mouth daily   Yes Historical Provider, MD   benazepril (LOTENSIN) 40 MG tablet Take 40 mg by mouth daily    Yes Historical Provider, MD   amLODIPine (NORVASC) 5 MG tablet Take 5 mg by mouth daily    Yes Historical Provider, MD       Allergies:  Patient has no known allergies. Social History:      The patient currently lives in [address] [place] [address] Drive:   reports that she has never smoked. She has never used smokeless tobacco.  ETOH:   reports that she drinks alcohol.       Family History:       Reviewed in

## 2019-06-26 NOTE — PROGRESS NOTES
BP remains low. Pt asymptomatic. Message sent to Dr Kilo Carney to notify. IV fluids infusing. Await response.   Electronically signed by Micky Garcia RN on 6/26/2019 at 11:21 AM

## 2019-06-26 NOTE — PROGRESS NOTES
Pt continues to do well. BP has stabilized. IV fluids infused per orders. Pt continues to decline needing pain medication for L knee surgical incision. Ace and dry dressing clean, dry, intact, changed by Demetra NP earlier today. Immobilizer remains in place. Will monitor.   Electronically signed by Caitlin Giles RN on 6/26/2019 at 4:19 PM

## 2019-06-26 NOTE — PROGRESS NOTES
10 mL, Intravenous, 2 times per day  sodium chloride flush 0.9 % injection 10 mL, 10 mL, Intravenous, PRN  magnesium hydroxide (MILK OF MAGNESIA) 400 MG/5ML suspension 30 mL, 30 mL, Oral, Daily PRN  ondansetron (ZOFRAN) injection 4 mg, 4 mg, Intravenous, Q6H PRN  enoxaparin (LOVENOX) injection 40 mg, 40 mg, Subcutaneous, Daily  morphine (PF) injection 1 mg, 1 mg, Intravenous, Q3H PRN  docusate sodium (COLACE) capsule 100 mg, 100 mg, Oral, BID  ondansetron (ZOFRAN) injection 4 mg, 4 mg, Intravenous, Q6H PRN  acetaminophen (TYLENOL) tablet 650 mg, 650 mg, Oral, Q6H PRN    ASSESSMENT AND PLAN    Post I and D and closure large left knee laceration yesterday in OR per Dr Nikkie Mcallister left leg in immobilizer  Change dressing daily as ordered. PT OT following  REhab if approved. Poor ascension and descension in immobilizer.    Lovenox as inpt then switch to ASA 81mg twice at day for 14 days for DVT prophylaxis      Dannielle Diamond [facility]  6/26/2019  12:55 PM

## 2019-06-26 NOTE — OP NOTE
debris and  foreign body. We first started with excisional debridement over the  skin, subcutaneous tissue and down to the fascia. We used #15 blade. We then used a Pulsavac with a total of 6 L of normal saline mixed with  gentamicin. We first did with the first 3 L to remove all the foreign  body and debris in the deep wound. We explored the area and did a  thorough debridement. At this point, we let the tourniquet down and hemostasis was secured. We irrigated the wound again with another 3 L of normal saline given the  contamination of the wound. At this point, the wound was clean and  hemostasis was secured. We then closed the wound in layers. We used a  2-0 Vicryl for deep layer and then 2-0 nylon in horizontal mattress for  the skin. Dressing was then applied in the form of Xeroform, 4x4,  sterile Webril, Ace wrap and a knee immobilizer was applied. The patient tolerated the procedure well and was taken to the recovery  in stable condition. POSTOPERATIVE PLAN:  The patient will be readmitted as an inpatient. We  will start PT/OT with weightbearing as tolerated. We will keep the  dressing as it is for two weeks and avoid bending the knee given that  she has a large deep transverse laceration. We probably keep the  sutures at least for three to four weeks.         Kellie Williamson MD    D: 06/26/2019 9:42:18       T: 06/26/2019 13:19:43     /[email or web address]  Job#: [id number]     Doc#: [id number]    CC:  Johnnie Chirinos MD

## 2019-06-26 NOTE — PROGRESS NOTES
Pt A&O.  VSS. BP a little low so held BP meds. Pt denies pain for me. Immobilizer remains in place. Dressing to L knee clean, dry, intact. Will monitor.   Electronically signed by Jennifer Saxena RN on 6/26/2019 at 9:55 AM

## 2019-06-26 NOTE — PROGRESS NOTES
Occupational Therapy   Occupational Therapy Initial Assessment  Date: 2019   Patient Name: Catrachita Sawyer  MRN: [id number]     : 1927    Date of Service: 2019    Assessment: Pt is 80 y.o. F who presents s/p mechanical fall resulting in L knee laceration. Pt s/p L knee I&D, WBAT with knee immobilizer in place. PTA pt lives in independent living at [facility]. Pt reports independence in self-care and use of 4WW for functional mobility. Pt has assistance for meals and cleaning and completes own laundry. Currently, pt presents with ROM/strength in [facility] for self-care and transfers. Pt completed bed mobility with SBA and sit <> stand transfers with min A. Pt is slightly impulsive reporting she feels confident managing current situation however requires frequent cues for safety, safe hand placement and LLE management. Pt completed functional mobility with RW with CGA and cues. Pt completed toileting with assist and cues for clothing management and completed grooming at sink with CGA for balance. Pt is hopeful to return to apartment at d/c - pt must dmeonstrate progress with therapy and independence with ADLs. Anticipate if pt has 24/ supervision/assist from family in apartment at d/c she may be safe to return with home health OT, otherwise recommend pt have continued skilled therapy to increase safety, mobility and independence in self-care. Discharge Recommendations:  Continue to assess pending progress, Patient would benefit from continued therapy after discharge  OT Equipment Recommendations  Other: Toilet safety frame for commode, hip kit for LB ADLs    Assessment   Performance deficits / Impairments: Decreased functional mobility ; Decreased ADL status; Decreased safe awareness;Decreased endurance;Decreased strength;Decreased balance  Assessment: Pt is 80 y.o. F who presents s/p mechanical fall resulting in L knee laceration. Pt s/p L knee I&D, WBAT with knee immobilizer in place.  PTA pt lives in independent living at [facility]. Pt reports independence in self-care and use of 4WW for functional mobility. Pt has assistance for meals and cleaning and completes own laundry. Currently, pt presents with ROM/strength in [facility] for self-care and transfers. Pt completed bed mobility with SBA and sit <> stand transfers with min A. Pt is slightly impulsive reporting she feels confident managing current situation however requires frequent cues for safety, safe hand placement and LLE management. Pt completed functional mobility with RW with CGA and cues. Pt completed toileting with assist and cues for clothing management and completed grooming at sink with CGA for balance. Pt is hopeful to return to apartment at d/ - pt must dmeonstrate progress with therapy and independence with ADLs. Anticipate if pt has 24/7 supervision/assist from family in apartment at d/c she may be safe to return with home health OT, otherwise recommend pt have continued skilled therapy to increase safety, mobility and independence in self-care. Treatment Diagnosis: impaired func mob, transfers, and ADL status   Prognosis: Good;Fair  Decision Making: Medium Complexity  History: PMH: hypertension  Exam: ADLs, transfers, func mob, bed mob  Assistance / Modification: CGA/min A for mobility, min A for ADLs  Patient Education: Role of OT, POC, safety   REQUIRES OT FOLLOW UP: Yes  Activity Tolerance  Activity Tolerance: Patient Tolerated treatment well  Safety Devices  Safety Devices in place: Yes(MADHAVI Cooper) notified)  Type of devices: Call light within reach; Chair alarm in place; Left in chair;Nurse notified;Gait belt           Patient Diagnosis(es): The encounter diagnosis was Knee laceration, left, initial encounter. has a past medical history of Arthritis, Hyperlipidemia, and Hypertension. has a past surgical history that includes Cholecystectomy;  Inguinal hernia repair (Right, 06/09/2016); and incision and drainage (Left, 6/25/2019). Treatment Diagnosis: impaired func mob, transfers, and ADL status       Restrictions  Restrictions/Precautions  Restrictions/Precautions: Fall Risk  Position Activity Restriction  Other position/activity restrictions: WBAT - knee immobilizer in place    Subjective   General  Chart Reviewed: Yes  Patient assessed for rehabilitation services?: Yes  Additional Pertinent Hx: Rodriguez Mcbride PA-C 6/25/19: Pt is \"80 y.o. female who presents to the emergency department by EMS for left knee injury and laceration. Patient resides at a long-term care facility in assisted living. She was using her walker and walking to be in the. She was running late and was walking faster than usual and tripped. She fell forward and landed on her left knee. She sustained a laceration to her left knee. There was significant bleeding and EMS was called. Patient states she was able to walk after falling without significant difficulty. Denies any her head, loss of consciousness. Denies any neck pain, back pain or other extremity injury. \"  Family / Caregiver Present: No  Referring Practitioner: MD Jamil Greenwood MD  Diagnosis: Open L knee wound   Subjective  Subjective: Pt met bedside, agreeable for OOB activity and therapy evaluation. Patient Currently in Pain: (only when bending a little (has knee immobilizer on  Simultaneous filing. User may not have seen previous data.)  Vital Signs  Temp: 98.4 °F (36.9 °C)  Temp Source: Oral  Pulse: 64  Heart Rate Source: Monitor  Resp: 18  BP: (!) 93/48  BP Location: Left Arm  BP Upper/Lower: Upper  MAP (mmHg): (!) 63  Patient Currently in Pain: (only when bending a little (has knee immobilizer on  Simultaneous filing.  User may not have seen previous data.)  Oxygen Therapy  SpO2: 90 %  O2 Device: None (Room air)     Social/Functional History  Social/Functional History  Lives With: Alone  Type of Home: ([address] )  Home Layout: One level  Home Access: Level entry  Bathroom Shower/Tub: Walk-in shower, Shower chair with back  [employer]: Handicap height  Bathroom Equipment: Grab bars in shower, Grab bars around toilet  Bathroom Accessibility: Walker accessible  Home Equipment: Rolling walker, 4 wheeled walker  ADL Assistance: [address]: (Meals in dining room, assist with cleaning - pt does laundry )  Ambulation Assistance: Independent(RW when traveling out, 4WW in facility )  Transfer Assistance: Independent  Active : No  Leisure & Hobbies: BINGO   Additional Comments: Denies other recent falls        Objective   Vision: Within Functional Limits  Hearing: Exceptions to [facility]  Hearing Exceptions: Hard of hearing/hearing concerns;Bilateral hearing aid      Orientation  Overall Orientation Status: Within Functional Limits     Balance  Sitting Balance: Stand by assistance  Standing Balance: Contact guard assistance  Standing Balance  Time: ~4 minutes   Activity: ADL tasks, func mob, transfers     Functional Mobility  Functional - Mobility Device: Rolling Walker  Activity: To/from bathroom  Assist Level: Contact guard assistance  Functional Mobility Comments: Pt completed functional mobility with RW with CGA. Pt moving at quick pace, cues for safety and to slow down. No overt LOB. Toilet Transfers  Toilet - Technique: Ambulating(RW)  Equipment Used: Grab bars(Comfort height commode)  Toilet Transfer: Minimal assistance  Toilet Transfers Comments: Min A to control descent to commode, min A to stand with only grab bar on L side - pt reports having a grab bar next to commode however recommend toilet safety frame on commode for BUE support to ensure safe transfers    ADL  Grooming: (Pt washed hands/face in stance at sink with CGA for balance )  Toileting: (Assist to manage clothing and cues to complete - completed pericare while seated )  Additional Comments: PTA pt reports independence in self-care tasks.  Anticipate pt to require min A

## 2019-06-26 NOTE — BRIEF OP NOTE
Brief Postoperative Note  ______________________________________________________________    Patient: Hitesh Blanca  YOB: 1927  MRN: [id number]  Date of Procedure: 6/25/2019    Pre-Op Diagnosis: LEFT KNEE OPEN WOUND    Post-Op Diagnosis: Same       Procedure(s):  INCISION AND DRAINAGE LEFT KNEE    Anesthesia: Anesthesia type not filed in the log.     Surgeon(s):  Claudia Novoa MD    Assistant: Chcaorta Pena    Estimated Blood Loss (mL): less than 50     Complications: None    Specimens:   * No specimens in log *    Implants:  * No implants in log *      Drains: * No LDAs found *    Findings: Ashley Sher MD  Date: 6/25/2019  Time: 5:18 PM

## 2019-06-27 ENCOUNTER — HOSPITAL ENCOUNTER (INPATIENT)
Age: 84
LOS: 7 days | Discharge: HOME HEALTH | DRG: 950 | End: 2019-07-04
Attending: PHYSICAL MEDICINE & REHABILITATION | Admitting: PHYSICAL MEDICINE & REHABILITATION
Payer: MEDICARE

## 2019-06-27 VITALS
OXYGEN SATURATION: 97 % | TEMPERATURE: 98.2 F | RESPIRATION RATE: 18 BRPM | SYSTOLIC BLOOD PRESSURE: 140 MMHG | WEIGHT: 170.86 LBS | DIASTOLIC BLOOD PRESSURE: 64 MMHG | BODY MASS INDEX: 28.47 KG/M2 | HEART RATE: 79 BPM | HEIGHT: 65 IN

## 2019-06-27 DIAGNOSIS — S81.012A KNEE LACERATION, LEFT, INITIAL ENCOUNTER: Primary | ICD-10-CM

## 2019-06-27 PROBLEM — S81.011S KNEE LACERATION, RIGHT, SEQUELA: Status: ACTIVE | Noted: 2019-06-27

## 2019-06-27 PROCEDURE — 6370000000 HC RX 637 (ALT 250 FOR IP): Performed by: INTERNAL MEDICINE

## 2019-06-27 PROCEDURE — 6360000002 HC RX W HCPCS: Performed by: INTERNAL MEDICINE

## 2019-06-27 PROCEDURE — 6370000000 HC RX 637 (ALT 250 FOR IP): Performed by: PHYSICAL MEDICINE & REHABILITATION

## 2019-06-27 PROCEDURE — 94760 N-INVAS EAR/PLS OXIMETRY 1: CPT

## 2019-06-27 PROCEDURE — 1280000000 HC REHAB R&B

## 2019-06-27 PROCEDURE — 2580000003 HC RX 258: Performed by: INTERNAL MEDICINE

## 2019-06-27 RX ORDER — AMLODIPINE BESYLATE 5 MG/1
2.5 TABLET ORAL NIGHTLY
Status: CANCELLED | OUTPATIENT
Start: 2019-06-27

## 2019-06-27 RX ORDER — SIMVASTATIN 20 MG
20 TABLET ORAL NIGHTLY
Status: DISCONTINUED | OUTPATIENT
Start: 2019-06-27 | End: 2019-07-04 | Stop reason: HOSPADM

## 2019-06-27 RX ORDER — ACETAMINOPHEN 325 MG/1
650 TABLET ORAL EVERY 4 HOURS PRN
Status: DISCONTINUED | OUTPATIENT
Start: 2019-06-27 | End: 2019-07-04 | Stop reason: HOSPADM

## 2019-06-27 RX ORDER — SENNA AND DOCUSATE SODIUM 50; 8.6 MG/1; MG/1
2 TABLET, FILM COATED ORAL 2 TIMES DAILY
Status: DISCONTINUED | OUTPATIENT
Start: 2019-06-27 | End: 2019-07-04 | Stop reason: HOSPADM

## 2019-06-27 RX ORDER — SIMVASTATIN 20 MG
20 TABLET ORAL NIGHTLY
Status: CANCELLED | OUTPATIENT
Start: 2019-06-27

## 2019-06-27 RX ORDER — TRAMADOL HYDROCHLORIDE 50 MG/1
100 TABLET ORAL EVERY 8 HOURS PRN
Status: DISCONTINUED | OUTPATIENT
Start: 2019-06-28 | End: 2019-07-04 | Stop reason: HOSPADM

## 2019-06-27 RX ORDER — ONDANSETRON 4 MG/1
4 TABLET, FILM COATED ORAL EVERY 8 HOURS PRN
Status: DISCONTINUED | OUTPATIENT
Start: 2019-06-27 | End: 2019-07-04 | Stop reason: HOSPADM

## 2019-06-27 RX ORDER — SENNA AND DOCUSATE SODIUM 50; 8.6 MG/1; MG/1
2 TABLET, FILM COATED ORAL 2 TIMES DAILY
Status: CANCELLED | OUTPATIENT
Start: 2019-06-27

## 2019-06-27 RX ORDER — TRAMADOL HYDROCHLORIDE 50 MG/1
50 TABLET ORAL EVERY 4 HOURS PRN
Status: CANCELLED | OUTPATIENT
Start: 2019-06-27

## 2019-06-27 RX ORDER — TRAMADOL HYDROCHLORIDE 50 MG/1
50 TABLET ORAL EVERY 8 HOURS PRN
Status: DISCONTINUED | OUTPATIENT
Start: 2019-06-28 | End: 2019-07-04 | Stop reason: HOSPADM

## 2019-06-27 RX ORDER — ACETAMINOPHEN 325 MG/1
650 TABLET ORAL EVERY 4 HOURS PRN
Status: CANCELLED | OUTPATIENT
Start: 2019-06-27

## 2019-06-27 RX ORDER — TRAMADOL HYDROCHLORIDE 50 MG/1
100 TABLET ORAL EVERY 4 HOURS PRN
Status: CANCELLED | OUTPATIENT
Start: 2019-06-27

## 2019-06-27 RX ORDER — ONDANSETRON 4 MG/1
4 TABLET, FILM COATED ORAL EVERY 8 HOURS PRN
Status: CANCELLED | OUTPATIENT
Start: 2019-06-27

## 2019-06-27 RX ORDER — AMLODIPINE BESYLATE 5 MG/1
2.5 TABLET ORAL NIGHTLY
Status: DISCONTINUED | OUTPATIENT
Start: 2019-06-27 | End: 2019-07-01

## 2019-06-27 RX ADMIN — ACETAMINOPHEN 650 MG: 325 TABLET ORAL at 09:48

## 2019-06-27 RX ADMIN — SIMVASTATIN 20 MG: 20 TABLET, FILM COATED ORAL at 20:22

## 2019-06-27 RX ADMIN — AMLODIPINE BESYLATE 2.5 MG: 5 TABLET ORAL at 20:22

## 2019-06-27 RX ADMIN — SODIUM CHLORIDE, PRESERVATIVE FREE 10 ML: 5 INJECTION INTRAVENOUS at 09:45

## 2019-06-27 RX ADMIN — ENOXAPARIN SODIUM 40 MG: 40 INJECTION SUBCUTANEOUS at 09:45

## 2019-06-27 RX ADMIN — SENNOSIDES AND DOCUSATE SODIUM 2 TABLET: 8.6; 5 TABLET ORAL at 20:22

## 2019-06-27 ASSESSMENT — PAIN SCALES - GENERAL
PAINLEVEL_OUTOF10: 0
PAINLEVEL_OUTOF10: 4
PAINLEVEL_OUTOF10: 0
PAINLEVEL_OUTOF10: 2
PAINLEVEL_OUTOF10: 0

## 2019-06-27 ASSESSMENT — PAIN DESCRIPTION - PAIN TYPE
TYPE: SURGICAL PAIN
TYPE: SURGICAL PAIN

## 2019-06-27 ASSESSMENT — PAIN DESCRIPTION - DESCRIPTORS
DESCRIPTORS: ACHING
DESCRIPTORS: ACHING

## 2019-06-27 ASSESSMENT — PAIN DESCRIPTION - FREQUENCY
FREQUENCY: INTERMITTENT
FREQUENCY: INTERMITTENT

## 2019-06-27 ASSESSMENT — PAIN DESCRIPTION - ONSET
ONSET: ON-GOING
ONSET: ON-GOING

## 2019-06-27 ASSESSMENT — PAIN DESCRIPTION - ORIENTATION
ORIENTATION: LEFT
ORIENTATION: LEFT

## 2019-06-27 ASSESSMENT — PAIN DESCRIPTION - LOCATION
LOCATION: KNEE
LOCATION: KNEE

## 2019-06-27 ASSESSMENT — PAIN - FUNCTIONAL ASSESSMENT
PAIN_FUNCTIONAL_ASSESSMENT: PREVENTS OR INTERFERES SOME ACTIVE ACTIVITIES AND ADLS
PAIN_FUNCTIONAL_ASSESSMENT: PREVENTS OR INTERFERES SOME ACTIVE ACTIVITIES AND ADLS

## 2019-06-27 ASSESSMENT — PAIN DESCRIPTION - PROGRESSION
CLINICAL_PROGRESSION: NOT CHANGED
CLINICAL_PROGRESSION: NOT CHANGED

## 2019-06-27 NOTE — PLAN OF CARE
Problem: Falls - Risk of:  Goal: Will remain free from falls  Description  Will remain free from falls  6/27/2019 1313 by Nora Bradley RN  Outcome: Ongoing  Note:   Fall risk assessment completed. Fall precautions in place. Call light within reach. Pt educated on calling for assistance before getting up. Walkway free of clutter. Will continue to monitor. Problem: Risk for Impaired Skin Integrity  Goal: Tissue integrity - skin and mucous membranes  Description  Structural intactness and normal physiological function of skin and  mucous membranes. 6/27/2019 1313 by Nora Bradley RN  Outcome: Ongoing  Note:   Song assessed. Skin assessed. Pt able to turn/reposition self q2h to prevent skin breakdown. Surgical dressing clean, dry, intact. QD dressing changes per orders. No new skin issues to note. Problem: Infection - Surgical Site:  Goal: Will show no infection signs and symptoms  Description  Will show no infection signs and symptoms  6/27/2019 1313 by Nora Bradley RN  Outcome: Ongoing  Note:   VSS and WDL. Vitals monitored per floor protocol. Surgical dressing clean, dry, intact. Dressing changes per orders. Labs monitored when ordered.

## 2019-06-27 NOTE — ANESTHESIA POSTPROCEDURE EVALUATION
Department of Anesthesiology  Postprocedure Note    Patient: Sheila Lin  MRN: [id number]  YOB: 1927  Date of evaluation: 6/27/2019  Time:  9:45 AM     Procedure Summary     Date:  06/25/19 Room / Location:  [facility] OR 01 / [facility] OR    Anesthesia Start:  1644 Anesthesia Stop:  1744    Procedure:  INCISION AND DRAINAGE LEFT KNEE (Left ) Diagnosis:  (LEFT KNEE OPEN WOUND)    Surgeon:  Jude Garnett MD Responsible Provider:  Miriam Dobbs MD    Anesthesia Type:  general ASA Status:  3          Anesthesia Type: No value filed. Kellie Phase I: Kellie Score: 10    Kellie Phase II:      Last vitals: Reviewed and per EMR flowsheets.        Anesthesia Post Evaluation    Patient location during evaluation: bedside  Patient participation: complete - patient participated  Level of consciousness: awake  Pain score: 2  Airway patency: patent  Nausea & Vomiting: no nausea and no vomiting  Complications: no  Cardiovascular status: blood pressure returned to baseline  Respiratory status: acceptable  Hydration status: euvolemic

## 2019-06-28 LAB
ANION GAP SERPL CALCULATED.3IONS-SCNC: 10 MMOL/L (ref 3–16)
BUN BLDV-MCNC: 16 MG/DL (ref 7–20)
CALCIUM SERPL-MCNC: 8.4 MG/DL (ref 8.3–10.6)
CHLORIDE BLD-SCNC: 110 MMOL/L (ref 99–110)
CO2: 20 MMOL/L (ref 21–32)
CREAT SERPL-MCNC: 1 MG/DL (ref 0.6–1.2)
GFR AFRICAN AMERICAN: >60
GFR NON-AFRICAN AMERICAN: 52
GLUCOSE BLD-MCNC: 97 MG/DL (ref 70–99)
HCT VFR BLD CALC: 26.4 % (ref 36–48)
HEMOGLOBIN: 8.6 G/DL (ref 12–16)
MAGNESIUM: 2.2 MG/DL (ref 1.8–2.4)
MCH RBC QN AUTO: 30.5 PG (ref 26–34)
MCHC RBC AUTO-ENTMCNC: 32.7 G/DL (ref 31–36)
MCV RBC AUTO: 93.3 FL (ref 80–100)
PDW BLD-RTO: 15.2 % (ref 12.4–15.4)
PLATELET # BLD: 153 K/UL (ref 135–450)
PMV BLD AUTO: 8.9 FL (ref 5–10.5)
POTASSIUM REFLEX MAGNESIUM: 4.5 MMOL/L (ref 3.5–5.1)
RBC # BLD: 2.83 M/UL (ref 4–5.2)
SODIUM BLD-SCNC: 140 MMOL/L (ref 136–145)
WBC # BLD: 7 K/UL (ref 4–11)

## 2019-06-28 PROCEDURE — 97530 THERAPEUTIC ACTIVITIES: CPT

## 2019-06-28 PROCEDURE — 6370000000 HC RX 637 (ALT 250 FOR IP): Performed by: PHYSICAL MEDICINE & REHABILITATION

## 2019-06-28 PROCEDURE — 80048 BASIC METABOLIC PNL TOTAL CA: CPT

## 2019-06-28 PROCEDURE — 97162 PT EVAL MOD COMPLEX 30 MIN: CPT

## 2019-06-28 PROCEDURE — 97116 GAIT TRAINING THERAPY: CPT

## 2019-06-28 PROCEDURE — 6360000002 HC RX W HCPCS: Performed by: PHYSICAL MEDICINE & REHABILITATION

## 2019-06-28 PROCEDURE — 83735 ASSAY OF MAGNESIUM: CPT

## 2019-06-28 PROCEDURE — 36415 COLL VENOUS BLD VENIPUNCTURE: CPT

## 2019-06-28 PROCEDURE — 97535 SELF CARE MNGMENT TRAINING: CPT

## 2019-06-28 PROCEDURE — 97167 OT EVAL HIGH COMPLEX 60 MIN: CPT

## 2019-06-28 PROCEDURE — 1280000000 HC REHAB R&B

## 2019-06-28 PROCEDURE — 97110 THERAPEUTIC EXERCISES: CPT

## 2019-06-28 PROCEDURE — 85027 COMPLETE CBC AUTOMATED: CPT

## 2019-06-28 RX ADMIN — AMLODIPINE BESYLATE 2.5 MG: 5 TABLET ORAL at 19:48

## 2019-06-28 RX ADMIN — ACETAMINOPHEN 650 MG: 325 TABLET ORAL at 07:28

## 2019-06-28 RX ADMIN — SIMVASTATIN 20 MG: 20 TABLET, FILM COATED ORAL at 19:48

## 2019-06-28 RX ADMIN — SENNOSIDES AND DOCUSATE SODIUM 2 TABLET: 8.6; 5 TABLET ORAL at 07:28

## 2019-06-28 RX ADMIN — ENOXAPARIN SODIUM 40 MG: 40 INJECTION SUBCUTANEOUS at 07:28

## 2019-06-28 ASSESSMENT — PAIN SCALES - GENERAL
PAINLEVEL_OUTOF10: 0

## 2019-06-28 ASSESSMENT — PAIN DESCRIPTION - PROGRESSION: CLINICAL_PROGRESSION: RESOLVED

## 2019-06-28 NOTE — PLAN OF CARE
and simple meal prep indep with AD - uses microwave at home :  Long term goals  Time Frame for Long term goals : same as stg : These goals were reviewed with this patient at the time of assessment and Baljit Macias is in agreement    Plan of Care:  Pt to be seen 90   mins per day for 5-6 day/week 1 week. Patient Education: role of OT, goals, walker safety      SPEECH THERAPY: Goals will be left blank if speech is not following this patient. Goals:                                                         CASE MANAGEMENT:  Goals:   Assist patient/family with discharge planning, patient/family counseling,   and coordination with insurance during ARU stay. Admission Period/Goal FIM SCORES  FIM Admit/Goal Score   Eating/Swallowing 5/6   Grooming 5/7   Bathing 4/6   Upper Body Dressing 5/7   Lower Body Dressing 3/6   Toileting 4/6   Bladder Nas, Accidents = FIM 6/6   Bowel  Nas, Accidents = FIM 6/6   Bed/Chair Transfer 3/6   Toilet Transfer 4/6   Tub/Shower Transfer  4/6   Locomotion:  Ambulation (Walk) / Wheelchair:  W = walk , w/c = wheelchair  Distance:   1= 0-49 ft , 2=  ft, 3= 150+ ft Distance: 2   Level of Assist: 2   Mode: w   FIM: 2/6      Stairs 2/6   Comprehension 5/6   Expression 5/6   Social Interaction 5/7   Problem Solving 5/6   Memory 5/6        Baljit Macias will be seen a minimum of 3 hours of therapy per day, a minimum of 5 out of 7 days per week. [] In this rare instance due to the nature of this patient's medical involvement, this patient will be seen 15 hours per week (900 minutes within a 7 day period). Treatments may include therapeutic exercises, gait training, neuromuscular re-ed, transfer training, community reintegration, bed mobility, w/c mobility and training, self care, home mgmt, cognitive training, energy conservation,dysphagia tx, speech/language/communication therapy, group therapy, and patient/family education.  In addition, dietician/nutritionist may monitor calorie count as well as intake and collaboratively work with SLP on dietary upgrades. Neuropsychology/Psychology may evaluate and provide necessary support. Medical issues being managed closely and that require 24 hour availability of a physician:   [] Swallowing Precautions  [] Bowel/Bladder Fx  [] Weight bearing precautions   [x] Wound Care    [x] Pain Mgmt   [x] Infection Protection   [x] DVT Prophylaxis   [x] Fall Precautions  [x] Fluid/Electrolyte/Nutrition Balance   [] Voice Protection   [] Respiratory  [] Other:    Medical Prognosis: [] Good  [x] Fair    [] Guarded   Total expected IRF days 7  Anticipated discharge destination:    [] Home Independently   [x] Home Modified Independent  [] Home with supervision    []SNF     [] Other                                           Physician anticipated functional outcomes:  Improve gait, transfers, self care to modified independent to allow safe return home to [facility]. IPOC brief synthesis: 66-year-old white female who is still fairly active, sustained a fall directly over her left knee with a large deeplaceration measuring about 14 to 15 cm.  The patient was admitted and taken to surgery on 6/25 by Dr. Eliu Isabel for deep wound exploration with drainage, debridement, and removal of foreign body with closure.  Postop the patient was started in therapies, but needs assist it is not safe to return to independent living. Albaro Mello is agreeable to rehab unit treatment, and has been cleared today for admission to our rehab unit to improve her transfers, gait, balance, and safety.  Patient has a knee immobilizer in place, which hinders her progress in therapy.         I have reviewed this initial plan of care and agree with its contents:    Title   Name    Date    Time    Physician: Dr. Traci Mayberry, 7/1/19,  9 AM    Case Mgmt:  Jamie Ivy [place]     Case Management   [phone]    6/28/2019  4:28 PM      OT: Celine Fleming, PAIGER/L  #[id number] 6/28/19 12:43 PM      PT: Electronically signed by JAVON, [facility] on 6/28/19 at 12:28 PM      RN: Cammie Giles RN, CRRN 06/30/2019 9:27 am      : BILL Alvarez  6/28/2019  1813

## 2019-06-28 NOTE — PROGRESS NOTES
gait pattern with LLE KI in place  Distance: 62' with 2 turns; short distances in therapy gym  Comments: pt reports pain from rubbing of brace L inner thigh during ambulation  Stairs/Curb  Stairs?: Yes  Stairs  # Steps : 4  Stairs Height: 6\"  Rails: Bilateral  Curbs: 6\"(one curb step)  Device: No Device;Rolling walker(no device step, RW curb step)  Other Apparatus: (L knee immobilizer)  Assistance: Contact guard assistance  Comment: pt instructed in non-reciprocal gait pattern \"up with the good and down with the bad\" and able to complete with CGA and cues to slow down for safety; cues to slow down for safety on curb step     Balance  Posture: Fair  Sitting - Static: Good  Sitting - Dynamic: Good  Standing - Static: Fair  Standing - Dynamic: Fair      PM session:   Pt pleasant and agreeable to therapy. Denies pain at rest.  Reports that padding was put at site where brace is rubbing and pt reports improved comfort. Knee immobilizer in place throughout the session. Pt ambulated 140' with RW and CGA. Cues to slow down for safety. Car transfer: CGA stand to sit to car, Kathy bringing LLE in and out of car, Kathy sit to stand due to LOB posteriorly; seat reclined so pt could scoot back for ease to LLE clearance into and out of car  Pt picked 3 tissues up off the floor with RW for UE support and reacher. VCs for technique and CGA-Kathy for balance. Seated exercises: x20 glute sets, x20 ankle pumps LLE and heel raises/toe raises RLE, x10 SLR AAROM LLE, x20 hip ADD sets, x10 LAQs RLE, x10 hip flex RLE  Sit to stand x5 with CGA-Kathy and cues for technique. Sit to stand transfers throughout session CGA-Kathy (improved with practice). Stand to sit transfers throughout session CGA. Pt tolerated tx session well.     Safety Device - Type of devices:  [x]  All fall risk precautions in place [] Bed alarm in place  [] Call light within reach [] Chair alarm in place [] Positioning belt [x] Gait belt [] Patient at risk for falls [] Left in bed [] Left in chair [] Telesitter in use [] Sitter present [] Nurse notified []  None  Electronically signed by Waleska Christine, PT [id number] on 6/28/2019 at 4:04 PM      Plan   Plan  Times per week: 5-6 days/wk  Times per day: Twice a day  Plan weeks: 1  Specific instructions for Next Treatment: car transfer  Current Treatment Recommendations: Functional Mobility Training, Transfer Training, Gait Training, Positioning, Patient/Caregiver Education & Training, Strengthening, ROM, Balance Training, Endurance Training, Stair training, Neuromuscular Re-education, Safety Education & Training, Home Exercise Program  Safety Devices  Type of devices:  All fall risk precautions in place, Gait belt  Restraints  Initially in place: No    Goals  Short term goals  Time Frame for Short term goals: 7 days from 6/28 or upon d/c  Short term goal 1: bed mobility indep  Short term goal 2: transfers mod I  Short term goal 3: ambulate 150' with RW and mod I  Short term goal 4: ascend/decend 12 steps with oje rails mod I  Short term goal 5: ascend/descend curb step with RW mod I  Long term goals  Time Frame for Long term goals : LTG=STG  Patient Goals   Patient goals : \"to be able to do everything I did before\"       Therapy Time   Individual Concurrent Group Co-treatment   Time In 1115         Time Out 1200         Minutes 45         Timed Code Treatment Minutes: 30 Minutes         Electronically signed by Waleska Christine, PT [id number] on 6/28/2019 at 12:17 PM     Second Session Therapy Time     Individual Co-treatment   Time In 1243     Time Out 1600     Minutes 39

## 2019-06-28 NOTE — PROGRESS NOTES
Patient is a 81 y/o female admitted to rehab with deep laceration to left knee, immobilizer worn at all times. A&O x4 with moments of forgetfulness. Transfers with assist of one with walker. On general diet, tolerating well. Medications taken whole with thin liquids. On lovenox for DVT prophylaxis. Pt has surgical incision on left knee with sutures and adaptic, dressing clean/dry/intact. On room air. Has been continent of urine and stool. LBM 6/28/19. Will monitor for safety.

## 2019-06-28 NOTE — DISCHARGE SUMMARY
Hospitalist Discharge Summary     Bev Hernandez  : 1927  MRN: [id number]    Admit date: 2019  Discharge date: 2019    Admitting Physician: Jaden Jimenez MD    Discharge Diagnoses:   Patient Active Problem List   Diagnosis    Spigelian hernia with bowel obstruction    Knee laceration, left, initial encounter    Knee laceration, right, sequela       Admission Condition: fair    Discharged Condition: stable    Discharge Exam:  VITALS:  BP (!) 140/64   Pulse 79   Temp 98.2 °F (36.8 °C) (Oral)   Resp 18   Ht 5' 5\" (1.651 m)   Wt 170 lb 13.7 oz (77.5 kg)   SpO2 97%   BMI 28.43 kg/m²   CONSTITUTIONAL:  awake, alert, cooperative, no apparent distress, and appears stated age  EYES:  Lids and lashes normal, PERRL, EOMI, sclera clear, conjunctiva normal  ENT:  NC/AT, MMM    NECK:  Supple, symmetrical, trachea midline, no adenopathy  HEMATOLOGIC/LYMPHATICS:  no cervical, supraclavicular or axillary lymphadenopathy  LUNGS:  clear to auscultation bilaterally, No increased work of breathing, good air exchange, no crackles or wheezing  CARDIOVASCULAR:  Regular rate and rhythm, normal S1 and S2, no S3 or S4, and no significant murmurs, rubs or gallops noted. Normal apical impulse,   ABDOMEN:  Normal active bowel sounds, soft, non-tender, non-distended, no masses palpated, no organomegally  MUSCULOSKELETAL:  Left knee bandaged, otherwise full range of motion noted. NEUROLOGIC:  Awake, alert, oriented to name, place and time. Cranial nerves II-XII are grossly intact. SKIN:  normal skin color, texture, turgor for age. Hospital Course:     Open wound of the left knee - s/p emergent I&D 2019  - Orthopedic Surgery followed    Essential (primary) hypertension - continue home meds and monitor blood pressure at home    Hyperlipidemia - No current evidence of Rhabdomyolysis or other adverse effects.  Continued statin therapy while in the hospital          Consults: orthopedic surgery    Imaging

## 2019-06-28 NOTE — PROGRESS NOTES
Occupational Therapy   Occupational Therapy Initial Assessment  Date: 2019   Patient Name: Andrew Lucas  MRN: [id number]     : 1927    Date of Service: 2019    Discharge Recommendations:  Home with assist PRN, Home with Home health OT  OT Equipment Recommendations  Other: TBD    Assessment   Performance deficits / Impairments: Decreased functional mobility ; Decreased ADL status; Decreased safe awareness;Decreased endurance;Decreased strength;Decreased balance;Decreased high-level IADLs  Assessment: Pt is a 81 yo female admitted after a fall which she describes as that she was moving too fast with her 4 wh walker because she was late to [facility]. She admits that she walks too quickly with the walker and leans over the walker as she walks. Pt was functioning independently PTA in her apartment, but cooked minimally as she went to the dining room for lunch and dinner. She states she is very upset about falling and not being able to go to [facility] with her family tomorrow, states she is depressed about her situation. Pt is currently functioning below her baseline in LB bathing and dressing due to difficulty bending with KI. Anticipate pt will require approx 1 week inpt rehab with skilled OT services for safe return home to her indep living apartment. Treatment Diagnosis: impaired func mob, transfers, and ADL status   Prognosis: Good  History: 15-year-old white female who is still fairly active, sustained a fall directly over her left knee with a large deeplaceration measuring about 14 to 15 cm. The patient was admitted and taken to surgery on  by Dr. Heather Henriquez for deep wound exploration with drainage, debridement, and removal of foreign body with closure. Postop the patient was started in therapies, but needs assist it is not safe to return to independent living.   She is agreeable to rehab unit treatment, and has been cleared today for admission to our rehab unit to improve her transfers, gait, Patient goals : \"I want to be able to get my own shoes and socks on, get around by myself\"       Therapy Time   Individual Concurrent Group Co-treatment   Time In 0800         Time Out 0930         Minutes 90         Timed Code Treatment Minutes: 75 Minutes(+15 min eval)       Phyllis Brown, OTR/L [id number]

## 2019-06-29 PROCEDURE — 94760 N-INVAS EAR/PLS OXIMETRY 1: CPT

## 2019-06-29 PROCEDURE — 97530 THERAPEUTIC ACTIVITIES: CPT

## 2019-06-29 PROCEDURE — 97116 GAIT TRAINING THERAPY: CPT

## 2019-06-29 PROCEDURE — 97535 SELF CARE MNGMENT TRAINING: CPT

## 2019-06-29 PROCEDURE — 97110 THERAPEUTIC EXERCISES: CPT

## 2019-06-29 PROCEDURE — 1280000000 HC REHAB R&B

## 2019-06-29 PROCEDURE — 6360000002 HC RX W HCPCS: Performed by: PHYSICAL MEDICINE & REHABILITATION

## 2019-06-29 PROCEDURE — 6370000000 HC RX 637 (ALT 250 FOR IP): Performed by: PHYSICAL MEDICINE & REHABILITATION

## 2019-06-29 RX ADMIN — SIMVASTATIN 20 MG: 20 TABLET, FILM COATED ORAL at 21:29

## 2019-06-29 RX ADMIN — ENOXAPARIN SODIUM 40 MG: 40 INJECTION SUBCUTANEOUS at 09:01

## 2019-06-29 RX ADMIN — AMLODIPINE BESYLATE 2.5 MG: 5 TABLET ORAL at 21:29

## 2019-06-29 ASSESSMENT — PAIN SCALES - GENERAL
PAINLEVEL_OUTOF10: 0

## 2019-06-29 NOTE — PROGRESS NOTES
well  Safety Devices  Type of devices: Call light within reach; Chair alarm in place; Left in chair;Gait belt         Patient Diagnosis(es): There were no encounter diagnoses. has a past medical history of Arthritis, Hyperlipidemia, and Hypertension. has a past surgical history that includes Cholecystectomy; Inguinal hernia repair (Right, 06/09/2016); and incision and drainage (Left, 6/25/2019). Restrictions  Restrictions/Precautions  Restrictions/Precautions: Fall Risk  Position Activity Restriction  Other position/activity restrictions: WBAT - knee immobilizer in place, IV left wrist  Subjective   General  Chart Reviewed: Yes  Patient assessed for rehabilitation services?: Yes  Additional Pertinent Hx: 66-year-old white female who is still fairly active, sustained a fall directly over her left knee with a large deeplaceration measuring about 14 to 15 cm. The patient was admitted and taken to surgery on 6/25 by Dr. Giselle Sevilla for deep wound exploration with drainage, debridement, and removal of foreign body with closure. Postop the patient was started in therapies, but needs assist it is not safe to return to independent living. She is agreeable to rehab unit treatment, and has been cleared today for admission to our rehab unit to improve her transfers, gait, balance, and safety. Patient has a knee immobilizer in place, which hinders her progress in therapy. (copied per note Dr Shaina Kent 6/27/19)  Family / Caregiver Present: No  Referring Practitioner: Lizabeth Mistry  Diagnosis: left knee open, deep laceration  Subjective  Subjective: Pt seen bedside. Pt declining any washing and dressing this date. Pt did agree to practicing socks and grooming tasks at the sink. Pt with no c/o pain.       Objective    ADL  Grooming: Stand by assistance(Pt stood at the sink to wash face and hands, brush hair, brush teeth, and apply make up.)  LE Dressing: Stand by assistance(Pt was able to lean forward and brendan/doff socks including

## 2019-06-29 NOTE — PLAN OF CARE
Problem: Risk for Impaired Skin Integrity  Goal: Tissue integrity - skin and mucous membranes  Description  Structural intactness and normal physiological function of skin and  mucous membranes. 6/29/2019 0014 by Juana Espitia RN  Outcome: Ongoing  Note:   Patient is at risk for impaired skin integrity. Offered to turn and reposition patient every two hours and as needed. Skin is assessed every shift and prn. Patient has pillow support, to help reduce skin breakdown. Barrier cream applied for incontinence as needed. Will continue to monitor. Problem: Falls - Risk of:  Goal: Will remain free from falls  Description  Will remain free from falls  6/29/2019 0014 by Juana Espitia RN  Outcome: Ongoing  Note:   Patient is a high fall risk. Patient free of falls this shift. Bed low, locked and alarmed at all times. Call light and bedside table is within reach. Arm band on wrist, fall light on outside of room, and nonskid socks are on patient. Notified patient to ask for assistance when needed. Patient verbalized understanding. Problem: HEMODYNAMIC STATUS  Goal: Patient has stable vital signs and fluid balance  6/29/2019 0014 by Juana Espitia RN  Outcome: Ongoing     Problem: ACTIVITY INTOLERANCE/IMPAIRED MOBILITY  Goal: Mobility/activity is maintained at optimum level for patient  6/29/2019 0014 by Juana Espitia RN  Outcome: Ongoing  Note:   Patient able to ambulate to bathroom without shortness of breath or pain. Patient ambulates with walker without issue. Patient on room air. Will continue to monitor and assess.

## 2019-06-30 PROCEDURE — 6370000000 HC RX 637 (ALT 250 FOR IP): Performed by: PHYSICAL MEDICINE & REHABILITATION

## 2019-06-30 PROCEDURE — 94760 N-INVAS EAR/PLS OXIMETRY 1: CPT

## 2019-06-30 PROCEDURE — 6360000002 HC RX W HCPCS: Performed by: PHYSICAL MEDICINE & REHABILITATION

## 2019-06-30 PROCEDURE — 1280000000 HC REHAB R&B

## 2019-06-30 RX ADMIN — SIMVASTATIN 20 MG: 20 TABLET, FILM COATED ORAL at 20:11

## 2019-06-30 RX ADMIN — ENOXAPARIN SODIUM 40 MG: 40 INJECTION SUBCUTANEOUS at 10:09

## 2019-06-30 RX ADMIN — AMLODIPINE BESYLATE 2.5 MG: 5 TABLET ORAL at 20:11

## 2019-06-30 ASSESSMENT — PAIN SCALES - GENERAL
PAINLEVEL_OUTOF10: 0
PAINLEVEL_OUTOF10: 0

## 2019-07-01 PROCEDURE — 97110 THERAPEUTIC EXERCISES: CPT

## 2019-07-01 PROCEDURE — 6360000002 HC RX W HCPCS: Performed by: PHYSICAL MEDICINE & REHABILITATION

## 2019-07-01 PROCEDURE — 94760 N-INVAS EAR/PLS OXIMETRY 1: CPT

## 2019-07-01 PROCEDURE — 97530 THERAPEUTIC ACTIVITIES: CPT

## 2019-07-01 PROCEDURE — 97116 GAIT TRAINING THERAPY: CPT

## 2019-07-01 PROCEDURE — 6370000000 HC RX 637 (ALT 250 FOR IP): Performed by: PHYSICAL MEDICINE & REHABILITATION

## 2019-07-01 PROCEDURE — 1280000000 HC REHAB R&B

## 2019-07-01 RX ORDER — AMLODIPINE BESYLATE 5 MG/1
5 TABLET ORAL NIGHTLY
Status: DISCONTINUED | OUTPATIENT
Start: 2019-07-01 | End: 2019-07-04 | Stop reason: HOSPADM

## 2019-07-01 RX ORDER — GUAIFENESIN/DEXTROMETHORPHAN 100-10MG/5
5 SYRUP ORAL EVERY 4 HOURS PRN
Status: DISCONTINUED | OUTPATIENT
Start: 2019-07-01 | End: 2019-07-04 | Stop reason: HOSPADM

## 2019-07-01 RX ADMIN — ENOXAPARIN SODIUM 40 MG: 40 INJECTION SUBCUTANEOUS at 08:39

## 2019-07-01 RX ADMIN — AMLODIPINE BESYLATE 5 MG: 5 TABLET ORAL at 21:50

## 2019-07-01 RX ADMIN — SIMVASTATIN 20 MG: 20 TABLET, FILM COATED ORAL at 21:50

## 2019-07-01 ASSESSMENT — PAIN SCALES - GENERAL
PAINLEVEL_OUTOF10: 0

## 2019-07-01 NOTE — FLOWSHEET NOTE
07/01/19 1426   Sacraments   Sacrament of Sick-Anointing Anointed  (by Deo Traylor on 6/30)   Electronically signed by Sebas Worrell on 7/1/2019 at 2:26 PM

## 2019-07-01 NOTE — PROGRESS NOTES
x 3.  IMC if PVR > 200ml or if any volume is > 500 ml.      Pain: Tramadol is ordered prn.        Janina Moore MD, 7/1/2019, 7:49 AM

## 2019-07-01 NOTE — PROGRESS NOTES
Occupational Therapy  OCCUPATIONAL THERAPY  Progress Note   Second Session    Patient Name: Eren Dooley Said Record Number: [id number]    Treatment Diagnosis: functional mobility below baseline s/p fall and L knee laceration    General  Chart Reviewed: Yes  Patient assessed for rehabilitation services?: Yes  Additional Pertinent Hx: 59-year-old white female who is still fairly active, sustained a fall directly over her left knee with a large deeplaceration measuring about 14 to 15 cm. The patient was admitted and taken to surgery on 6/25 by Dr. Alan Ulloa for deep wound exploration with drainage, debridement, and removal of foreign body with closure. Postop the patient was started in therapies, but needs assist it is not safe to return to independent living. She is agreeable to rehab unit treatment, and has been cleared today for admission to our rehab unit to improve her transfers, gait, balance, and safety. Patient has a knee immobilizer in place, which hinders her progress in therapy. (copied per note Dr Olivera Him 6/27/19)  Family / Caregiver Present: No  Referring Practitioner: Lizabeth Mistry  Diagnosis: left knee open, deep laceration     Restrictions/Precautions  Restrictions/Precautions: Fall Risk        Position Activity Restriction  Other position/activity restrictions: WBAT - knee immobilizer in place    Subjective: Pt pleasant and agreeable to OT intervention;   Denies pain. Objective: agreeable for UE strengthening, household t/f    Assessment: Pt tolerated session well, she is pleasant & cooperative but [place]. Pt completes mobility and transfers with SBA. She completed 1 set of 10-15 for UE strengthening using 2 lb wts. Pt given VCs to slow down and not allow weight to hit her thigh or shld. Pt instructed how to use reacher to grab items off floor and place into walker basket; pt completed task w/ SBA.  Pt does not own a reacher however OT feels she would benefit from one to reduce fall risk when retrieving items off floor. Discussed DME needs--has a 4ww and RW, GB @ toilet and walk in shower, has shower chair--pt enjoys going to [employer], horse races and chair volleyball at [employer]. Pt has 5 adult children who live in town & are very supportive.  Cont w/ POC, tx time: 45 minutes    Safety Device - Type of devices:  []  All fall risk precautions in place [] Bed alarm in place  [] Call light within reach [] Chair alarm in place [] Positioning belt [] Gait belt [] Patient at risk for falls [] Left in bed [] Left in chair [] Telesitter in use [] Sitter present [] Nurse notified []  None      Therapy Time   Individual Co-treatment   Time In [id number]     Time Out 1430     Minutes 45       Electronically signed by AUGUSTINA James/L #[id number] on 7/1/2019 at 1:55 PM

## 2019-07-02 LAB — PLATELET # BLD: 179 K/UL (ref 135–450)

## 2019-07-02 PROCEDURE — 6360000002 HC RX W HCPCS: Performed by: PHYSICAL MEDICINE & REHABILITATION

## 2019-07-02 PROCEDURE — 85049 AUTOMATED PLATELET COUNT: CPT

## 2019-07-02 PROCEDURE — 6370000000 HC RX 637 (ALT 250 FOR IP): Performed by: PHYSICAL MEDICINE & REHABILITATION

## 2019-07-02 PROCEDURE — 97110 THERAPEUTIC EXERCISES: CPT

## 2019-07-02 PROCEDURE — 1280000000 HC REHAB R&B

## 2019-07-02 PROCEDURE — 36415 COLL VENOUS BLD VENIPUNCTURE: CPT

## 2019-07-02 PROCEDURE — 97535 SELF CARE MNGMENT TRAINING: CPT

## 2019-07-02 PROCEDURE — 97116 GAIT TRAINING THERAPY: CPT

## 2019-07-02 PROCEDURE — 97530 THERAPEUTIC ACTIVITIES: CPT

## 2019-07-02 RX ADMIN — ENOXAPARIN SODIUM 40 MG: 40 INJECTION SUBCUTANEOUS at 08:20

## 2019-07-02 RX ADMIN — GUAIFENESIN AND DEXTROMETHORPHAN 5 ML: 100; 10 SYRUP ORAL at 16:54

## 2019-07-02 RX ADMIN — AMLODIPINE BESYLATE 5 MG: 5 TABLET ORAL at 20:52

## 2019-07-02 RX ADMIN — SIMVASTATIN 20 MG: 20 TABLET, FILM COATED ORAL at 20:52

## 2019-07-02 ASSESSMENT — PAIN SCALES - GENERAL
PAINLEVEL_OUTOF10: 0
PAINLEVEL_OUTOF10: 0

## 2019-07-02 NOTE — PLAN OF CARE
Problem: Risk for Impaired Skin Integrity  Goal: Tissue integrity - skin and mucous membranes  Description  Structural intactness and normal physiological function of skin and  mucous membranes. 7/2/2019 1021 by Rubin Bernardo RN  Outcome: Ongoing  Note:   I & D incision remains to left knee with sutures intact; immobilizer in place as ordered. Upper right edge of immobilizer is hitting on left upper, inner thigh with mepilex in place for protection. 7/1/2019 2310 by Rm Hna RN  Outcome: Ongoing  Note:   Surgica incision to l knee with dressing intact. Scattered bruising noted. Dry and flaky. No new skin breakdown. Able to reposition self. Pillow support. Stocking removed. Clean and dry skin. Problem: Falls - Risk of:  Goal: Will remain free from falls  Description  Will remain free from falls  7/2/2019 1021 by Rubin Bernardo RN  Outcome: Ongoing  Note:   Fall risk assessment completed. Fall precautions in place. Call light within reach. Pt educated on calling for assistance before getting up. Walkway free of clutter. Will continue to monitor. 7/1/2019 2310 by Sivakumar Mcgraw RN  Outcome: Ongoing  Note:   Pt AAO X4. Free of fall. Transfers with assist of one, walker, and gait belt. Yellow socks on. Wheels locked. Bed lowest position. Alarm on. Call light, over the bed table, and personal belonging in reach. Hourly rounding. Patient instructed to call for needs or changes.   Goal: Absence of physical injury  Description  Absence of physical injury  Outcome: Ongoing     Problem: OXYGENATION/RESPIRATORY FUNCTION  Goal: Patient will achieve/maintain normal respiratory rate/effort  Description  Respiratory rate and effort will be within normal limits for the patient  Outcome: Ongoing     Problem: HEMODYNAMIC STATUS  Goal: Patient has stable vital signs and fluid balance  Outcome: Ongoing     Problem: FLUID AND ELECTROLYTE IMBALANCE  Goal: Fluid and electrolyte balance are achieved/maintained  Outcome: Ongoing

## 2019-07-02 NOTE — PROGRESS NOTES
Right: Independent  Supine to Sit: Independent  Sit to Supine: Independent  Scooting: Independent  Comment: flat therapy mat, no rails     Transfers  Sit to Stand: Stand by assistance  Stand to sit: Stand by assistance  Bed to Chair: Stand by assistance(ambulating with RW)  Car Transfer: Stand by assistance(cues for hand placement; seated reclined back so pt could scoot back to clear LLE into car)     Ambulation  Ambulation?: Yes  More Ambulation?: No  Ambulation 1  Surface: level tile  Device: Rolling Walker  Other Apparatus: Knee Immobilizer; Left  Assistance: Stand by assistance;Supervision  Quality of Gait: step through gait pattern; cues to stay within walker base and to slow down for safety, especially when turning, no LOB  Distance: 440' and short distances in therapy gym  Stairs/Curb  Stairs?: Yes  Stairs  # Steps : 12  Stairs Height: 6\"  Rails: Bilateral  Curbs: 6\"(one trial)  Device: Rolling walker; No Device(no device steps, RW curb step)  Other Apparatus: (L knee immobilizer)  Assistance: Stand by assistance;Supervision(SBA-sup steps; SBA curb step)  Comment: pt recalled correct sequencing without cues but requires cues to slow down for safety; non-reciprocal gait pattern on steps        Exercises  Quad Sets: x20 LLE  Gluteal Sets: x20  Hip Flexion: x20 RLE  Hip Abduction: x25 hip ADD sets  Knee Long Arc Quad: x20 RLE  Ankle Pumps: x30 joe  Comments: ther ex seated in w/c  Other exercises  Other exercises?: No       PM session:   Pt ambulated approx 500' with multiple turns with RW and SBA. Better pace this PM.  One VC to stay within walker base. Pt stood for approx 10 min tapping balloon back and forth with CGA-SBA and no LOB. Intermittent UE support on RW. Good coordination throughout.   Standing exercises in parallel bars with SBA: x15 alternating hip flex with knee ext, alternating hip ext, alternating hip ABD, heel raises; side stepping R and L 8' 5 reps each direction for hip ABD strengthening  Pt ambulated short distances in therapy gym with SBA. One VC not to lift walker over objects. No LOB, but pt demonstrated poor safety. Pt stood for approx 3 min tossing bean bags with SBA for balance and intermittent UE support on RW. Seated exercises: x30 ankle pumps joe (cues to slow down)  Transfers SBA throughout the session. Pt tolerated tx session well. Safety Device - Type of devices:  [x]  All fall risk precautions in place [] Bed alarm in place  [] Call light within reach [] Chair alarm in place [] Positioning belt [x] Gait belt [] Patient at risk for falls [] Left in bed [] Left in chair [] Telesitter in use [] Sitter present [] Nurse notified []  None    Electronically signed by Korin Ray, PT [id number] on 7/2/2019 at 4:02 PM         Goals  Short term goals  Time Frame for Short term goals: 7 days from 6/28 or upon d/c  Short term goal 1: bed mobility indep met 6/29  Short term goal 2: transfers mod I  Short term goal 3: ambulate 150' with RW and mod I  Short term goal 4: ascend/decend 12 steps with joe rails mod I  Short term goal 5: ascend/descend curb step with RW mod I  Long term goals  Time Frame for Long term goals : LTG=STG  Patient Goals   Patient goals : \"to be able to do everything I did before\"    Plan    Plan  Times per week: 5-6 days/wk  Times per day: Twice a day  Plan weeks: 1  Specific instructions for Next Treatment: increase activity as tolerated  Current Treatment Recommendations: Functional Mobility Training, Transfer Training, Gait Training, Positioning, Patient/Caregiver Education & Training, Strengthening, ROM, Balance Training, Endurance Training, Stair training, Neuromuscular Re-education, Safety Education & Training, Home Exercise Program  Safety Devices  Type of devices:  All fall risk precautions in place, Gait belt  Restraints  Initially in place: No     Therapy Time   Individual Concurrent Group Co-treatment   Time In 1030         Time Out 1115         Minutes 45

## 2019-07-02 NOTE — PATIENT CARE CONFERENCE
[facility]  Inpatient Rehabilitation  Weekly Team Conference Note      Date: 7/3/2019  Patient Name:  Christos Tovar    MRN: [id number]  : 1927  Gender: Female  Physician: Dr Cecilia Hanley   Diagnosis: Knee laceration, right, sequela [S81.011S]    CASE MANAGEMENT  Assessment: Goal is home     PHYSICAL THERAPY    Bed mobility  Bridging: Independent  Rolling to Left: Independent  Rolling to Right: Independent  Supine to Sit: Independent  Sit to Supine: Independent  Scooting: Independent  Comment: flat therapy mat, no rails    Transfers:  Sit to Stand: Stand by assistance  Stand to sit: Stand by assistance  Bed to Chair: Stand by assistance(ambulating with RW)  Comment: cues for hand placement and to extend LLE prior to transfers    Ambulation 1  Surface: level tile  Device: Rolling Walker  Other Apparatus: Knee Immobilizer, Left  Assistance: Stand by assistance, Supervision  Quality of Gait: step through gait pattern; cues to stay within walker base and to slow down for safety, especially when turning, no LOB  Distance: 440' and short distances in therapy gym  Comments: pt reports pain from rubbing of brace L inner thigh during ambulation    Stairs  # Steps : 12  Stairs Height: 6\"  Rails: Bilateral  Curbs: 6\"(one trial)  Device: Rolling walker, No Device(no device steps, RW curb step)  Other Apparatus: (L knee immobilizer)  Assistance: Stand by assistance, Supervision(SBA-sup steps; SBA curb step)  Comment: pt recalled correct sequencing without cues but requires cues to slow down for safety; non-reciprocal gait pattern on steps    Car Transfer: Stand by assistance(cues for hand placement; seated reclined back so pt could scoot back to clear LLE into car)      FIMS:  Bed, Chair, Wheel Chair: 5 - Requires setup/supervision/cues  Walk: 5 - Supervision Requires standby supervision or cuing to walk at least 150 feet  Distance Walked: 440'  Wheel Chair: 0 - Activity Not Assessed/Does Not Occur  Stairs: 5-

## 2019-07-02 NOTE — PROGRESS NOTES
Department of [facility] Rosmery Olivera Him Progress Note    Patient Identification:  Georjean Gowers  [id number]  : 1927  Admit date: 2019      Diagnosis:   Patient Active Problem List   Diagnosis    Spigelian hernia with bowel obstruction    Knee laceration, left, initial encounter    Knee laceration, right, sequela           Subjective: Pt seen this AM.  Patient did well in her therapies yesterday. She is keeping her left knee in the immobilizer to decrease the stress on her incision. She is not having much pain in her incision. Labs upon admission look good and are stable. Her blood pressure is running high, so I increased her Norvasc yesterday. We will discuss her in rehab conference tomorrow with the entire rehab team, but we think patient will be ready for discharge to home on . Will fill out her AISHWARYA of meds tomorrow for her discharge Thursday.     BP (!) 143/77   Pulse 76   Temp 98 °F (36.7 °C) (Oral)   Resp 18   Ht 5' 5\" (1.651 m)   Wt 163 lb 12.8 oz (74.3 kg)   SpO2 97%   BMI 27.26 kg/m²     Last 24 hour lab  Recent Results (from the past 24 hour(s))   Platelet count    Collection Time: 19  8:37 AM   Result Value Ref Range    Platelets 130 662 - 342 K/uL       Therapy progress:  PT  Position Activity Restriction  Other position/activity restrictions: WBAT - knee immobilizer in place AATs  Objective     Sit to Stand: Stand by assistance, Contact guard assistance  Stand to sit: Stand by assistance  Device: Rolling Walker  Other Apparatus: Knee Immobilizer, Left  Assistance: Stand by assistance  Distance: 440' and short distances in therapy gym  OT  PT Equipment Recommendations  Equipment Needed: No  Other: pt has RW and 4WW                        Assessment and Plan:         Open wound of the left knee - s/p emergent I&D and closure large left knee laceration in OR per Katherine Raines left leg in immobilizer     Essential (primary) hypertension

## 2019-07-02 NOTE — PROGRESS NOTES
Occupational Therapy  Facility/Department: [address] REHAB  Daily Treatment Note  NAME: Shaheed Muro  : 1927  MRN: [id number]    Date of Service: 2019    Discharge Recommendations:  Home with assist PRN, Home with Home health OT, S Level 2  OT Equipment Recommendations  Other: owns RW, 4ww, GB @ toilet and walk-in shower, shower chair; may benefit from reacher, SA, [address] shoe horn and sponge    Assessment   Performance deficits / Impairments: Decreased functional mobility ; Decreased ADL status; Decreased safe awareness;Decreased endurance;Decreased strength;Decreased balance;Decreased high-level IADLs  Assessment: Pt making steady progress w/ OT intervention. Pt completes mobility and transfers with SBA, however required VCs for safety; tends to plop down when fatigued. OT had to wrap L LE prior to shower, pt performed bathing w min A to clean & dry L foot. Pt would benefit from reacher, [address] shoe horn & sponge & SA for IND w/ LB ADLs--needed min A to doff L sock and L shoe including tying. Cont w POC thru --will return home w/ HH services and family support  Treatment Diagnosis: impaired func mob, transfers, and ADL status   Prognosis: Good  History: 77-year-old white female who is still fairly active, sustained a fall directly over her left knee with a large deeplaceration measuring about 14 to 15 cm. The patient was admitted and taken to surgery on  by Dr. Margarita Rascon for deep wound exploration with drainage, debridement, and removal of foreign body with closure. Postop the patient was started in therapies, but needs assist it is not safe to return to independent living. She is agreeable to rehab unit treatment, and has been cleared today for admission to our rehab unit to improve her transfers, gait, balance, and safety. Patient has a knee immobilizer in place, which hinders her progress in therapy. (copied per note Dr Areli Almonte 19)  Exam: ADLs, transfers, func mob, bed mob  Assistance / includes Cholecystectomy; Inguinal hernia repair (Right, 06/09/2016); and incision and drainage (Left, 6/25/2019). Restrictions  Restrictions/Precautions  Restrictions/Precautions: Fall Risk  Position Activity Restriction  Other position/activity restrictions: WBAT - knee immobilizer in place AATs  Subjective   General  Chart Reviewed: Yes  Patient assessed for rehabilitation services?: Yes  Additional Pertinent Hx: 80-year-old white female who is still fairly active, sustained a fall directly over her left knee with a large deeplaceration measuring about 14 to 15 cm. The patient was admitted and taken to surgery on 6/25 by Dr. Timur Sandoval for deep wound exploration with drainage, debridement, and removal of foreign body with closure. Postop the patient was started in therapies, but needs assist it is not safe to return to independent living. She is agreeable to rehab unit treatment, and has been cleared today for admission to our rehab unit to improve her transfers, gait, balance, and safety. Patient has a knee immobilizer in place, which hinders her progress in therapy. (copied per note Dr Helen Gilbert 6/27/19)  Family / Caregiver Present: No  Referring Practitioner: Joanna Ye  Diagnosis: left knee open, deep laceration  Subjective  Subjective: pt met in room, she was resting in bed quietly; Dr Helen Gilbert in briefly to examine pt, plans to return home on Thurs 7/4/19  General Comment  Comments: pt reporting L thigh & knee pain; agreeable for shower      Orientation  Orientation  Overall Orientation Status: Within Functional Limits  Objective    ADL  Feeding: Modified independent (wears dentures)  Grooming: Modified independent (performed oral care, hair care and curling hair while seated at sink)  UE Bathing: Stand by assistance;Setup(OT managed water on/off, performed bathing tasks while seated on shower chair)  LE Bathing: Minimal assistance(OT wrapped L LE prior to shower, pt needed close SBA to wash, rinse & dry LB; stood

## 2019-07-02 NOTE — DISCHARGE INSTR - COC
Continuity of Care Form    Patient Name: Gabriele Colón   :  1927  MRN:  [id number]    Admit date:  2019  Discharge date:  2019    Code Status Order: Full Code   Advance Directives:   [facility] Documentation     Date/Time Healthcare Directive Type of Healthcare Directive Copy in [address] Agent's Name Healthcare Agent's Phone Number    19  Yes, patient has an advance directive for healthcare treatment  Durable power of  for health care;Living will  Other (Comment) Need to ask daughter to bring in paperwork  --  --  --          Admitting Physician:  Sheila Weinberg MD  PCP: Kenyetta Menon MD    Discharging Nurse: [facility] Unit/Room#: B1A-2909/0775-84  Discharging Unit Phone Number: [phone]    Emergency Contact:   Extended Emergency Contact Information  Primary Emergency Contact: Jeane Kline  Address: [facility]           [id number]  Home Phone: [phone]  Relation: Child  Secondary Emergency Contact: [address] [place] [address] Phone: [phone]  Relation: Child    Past Surgical History:  Past Surgical History:   Procedure Laterality Date    CHOLECYSTECTOMY      INCISION AND DRAINAGE Left 2019    INCISION AND DRAINAGE LEFT KNEE performed by Pura Page MD at [address] [place] [address] Right 2016    incarcerated spigelian IHR with mesh       Immunization History:   Immunization History   Administered Date(s) Administered    Tdap (Boostrix, Adacel) 2019       Active Problems:  Patient Active Problem List   Diagnosis Code    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    Knee laceration, left, initial encounter S81.012A    Knee laceration, right, sequela S81.011S       Isolation/Infection:   Isolation          No Isolation            Nurse Assessment:  Last Vital Signs: BP (!) 143/77   Pulse 76   Temp 98 °F (36.7 °C) (Oral)   Resp 18   Ht 5' 5\" (1.651 m)   Wt 163 with patient):  Hearing Aides bilateral    RN SIGNATURE:  Electronically signed by Shruthi Mojica RN on 7/4/19 at 8:41 AM    CASE MANAGEMENT/SOCIAL WORK SECTION    Inpatient Status Date:   6-    Readmission Risk Assessment Score:  Readmission Risk              Risk of Unplanned Readmission:        10           Discharging to Facility/ Agency   · Name:     [facility]  · Address:  · Phone:     [phone]  · Fax:         [phone]      / signature: Karla Albarran [place]     Case Management   [phone]    7/2/2019  1:49 PM      PHYSICIAN SECTION    Prognosis: Good    Condition at Discharge: Stable    Rehab Potential (if transferring to Rehab): Good    Recommended Labs or Other Treatments After Discharge: PT,OT,VN    Physician Certification: I certify the above information and transfer of Jing Vieira  is necessary for the continuing treatment of the diagnosis listed and that she requires Home Care for less 30 days.      Update Admission H&P: No change in H&P    PHYSICIAN SIGNATURE:  Electronically signed by Ryan Cannon MD on 7/3/19 at 9:14 AM

## 2019-07-02 NOTE — CARE COORDINATION
LSW was called to the room to discuss pt's status with family members. Family reports they had been on vacation and just returned. LSW informed them of Team Conference on Wednesday and that she is doing so well MD is planning for her DC on Thursday. THey are very pleased with this plan. Discussion held regarding possible post acute services such as home care to continue her progress. She reports they will want to use [facility]. LSW informed them of recommendation to see her pcp within 10 days post discharge. Dgtr will make the appt with Dr Janis Krabbe. Dgtr reports they will be able to work on the dressing change with the mother.   Lauren Boyd [place]     Case Management   [phone]    7/2/2019  1:48 PM
informed patient of preferred  time on date of discharge which is between 10 - 12 noon.     Raymon Nagy [place]     Case Management   [phone]    6/28/2019  4:47 PM

## 2019-07-03 LAB
ANION GAP SERPL CALCULATED.3IONS-SCNC: 14 MMOL/L (ref 3–16)
BUN BLDV-MCNC: 12 MG/DL (ref 7–20)
CALCIUM SERPL-MCNC: 9 MG/DL (ref 8.3–10.6)
CHLORIDE BLD-SCNC: 105 MMOL/L (ref 99–110)
CO2: 22 MMOL/L (ref 21–32)
CREAT SERPL-MCNC: 0.9 MG/DL (ref 0.6–1.2)
GFR AFRICAN AMERICAN: >60
GFR NON-AFRICAN AMERICAN: 59
GLUCOSE BLD-MCNC: 143 MG/DL (ref 70–99)
HCT VFR BLD CALC: 31.4 % (ref 36–48)
HEMOGLOBIN: 10.3 G/DL (ref 12–16)
MCH RBC QN AUTO: 30.6 PG (ref 26–34)
MCHC RBC AUTO-ENTMCNC: 33 G/DL (ref 31–36)
MCV RBC AUTO: 92.9 FL (ref 80–100)
PDW BLD-RTO: 15.8 % (ref 12.4–15.4)
PLATELET # BLD: 209 K/UL (ref 135–450)
PMV BLD AUTO: 9.5 FL (ref 5–10.5)
POTASSIUM SERPL-SCNC: 3.7 MMOL/L (ref 3.5–5.1)
RBC # BLD: 3.37 M/UL (ref 4–5.2)
SODIUM BLD-SCNC: 141 MMOL/L (ref 136–145)
WBC # BLD: 7.4 K/UL (ref 4–11)

## 2019-07-03 PROCEDURE — 6360000002 HC RX W HCPCS: Performed by: PHYSICAL MEDICINE & REHABILITATION

## 2019-07-03 PROCEDURE — 36415 COLL VENOUS BLD VENIPUNCTURE: CPT

## 2019-07-03 PROCEDURE — 97530 THERAPEUTIC ACTIVITIES: CPT | Performed by: PHYSICAL THERAPIST

## 2019-07-03 PROCEDURE — 94760 N-INVAS EAR/PLS OXIMETRY 1: CPT

## 2019-07-03 PROCEDURE — 97535 SELF CARE MNGMENT TRAINING: CPT

## 2019-07-03 PROCEDURE — 85027 COMPLETE CBC AUTOMATED: CPT

## 2019-07-03 PROCEDURE — 6370000000 HC RX 637 (ALT 250 FOR IP): Performed by: PHYSICAL MEDICINE & REHABILITATION

## 2019-07-03 PROCEDURE — 97116 GAIT TRAINING THERAPY: CPT | Performed by: PHYSICAL THERAPIST

## 2019-07-03 PROCEDURE — 97110 THERAPEUTIC EXERCISES: CPT

## 2019-07-03 PROCEDURE — 97530 THERAPEUTIC ACTIVITIES: CPT

## 2019-07-03 PROCEDURE — 80048 BASIC METABOLIC PNL TOTAL CA: CPT

## 2019-07-03 PROCEDURE — 97110 THERAPEUTIC EXERCISES: CPT | Performed by: PHYSICAL THERAPIST

## 2019-07-03 PROCEDURE — 1280000000 HC REHAB R&B

## 2019-07-03 RX ORDER — SIMVASTATIN 20 MG
20 TABLET ORAL NIGHTLY
Qty: 30 TABLET | Refills: 2 | Status: SHIPPED | OUTPATIENT
Start: 2019-07-03 | End: 2022-03-09

## 2019-07-03 RX ORDER — TRAMADOL HYDROCHLORIDE 50 MG/1
50 TABLET ORAL EVERY 8 HOURS PRN
Qty: 30 TABLET | Refills: 0 | Status: SHIPPED | OUTPATIENT
Start: 2019-07-03 | End: 2019-07-10

## 2019-07-03 RX ORDER — LISINOPRIL 20 MG/1
40 TABLET ORAL DAILY
Status: DISCONTINUED | OUTPATIENT
Start: 2019-07-03 | End: 2019-07-04 | Stop reason: HOSPADM

## 2019-07-03 RX ADMIN — LISINOPRIL 40 MG: 20 TABLET ORAL at 08:43

## 2019-07-03 RX ADMIN — ENOXAPARIN SODIUM 40 MG: 40 INJECTION SUBCUTANEOUS at 08:43

## 2019-07-03 RX ADMIN — SIMVASTATIN 20 MG: 20 TABLET, FILM COATED ORAL at 20:10

## 2019-07-03 RX ADMIN — GUAIFENESIN AND DEXTROMETHORPHAN 5 ML: 100; 10 SYRUP ORAL at 08:43

## 2019-07-03 RX ADMIN — SENNOSIDES AND DOCUSATE SODIUM 2 TABLET: 8.6; 5 TABLET ORAL at 08:43

## 2019-07-03 RX ADMIN — AMLODIPINE BESYLATE 5 MG: 5 TABLET ORAL at 20:10

## 2019-07-03 ASSESSMENT — PAIN SCALES - GENERAL: PAINLEVEL_OUTOF10: 0

## 2019-07-03 NOTE — PROGRESS NOTES
Physical Therapy  Facility/Department: [address] REHAB  Daily Treatment Note- AM and PM  NAME: Yenifer Aguirre  : 1927  MRN: [id number]    Date of Service: 7/3/2019    Discharge Recommendations:  Home with assist PRN, Patient would benefit from continued therapy after discharge, Home with Home health PT, S Level 1   PT Equipment Recommendations  Equipment Needed: No  Other: pt has RW and 4WW    Assessment   Body structures, Functions, Activity limitations: Decreased functional mobility ; Decreased strength;Decreased safe awareness;Decreased balance; Increased Pain  Assessment: Pt has met 3/5 LTG. Patient is ambulating community distances with RW with MI including transfers. She does require cues to slow down for safety during mobility, but no LOB. patient independent with bed mobility. Performs 12 steps with rails with supervision as well as  curb step with wheeled walker, occasional cues for sequencing steps. D/C for return home to indep living on Thursday,  with home health. Treatment Diagnosis: functional mobility below baseline s/p fall and L knee laceration  Specific instructions for Next Treatment: increase activity as tolerated  Prognosis: Good  Patient Education: slowing down for safety  REQUIRES PT FOLLOW UP: Yes  Activity Tolerance  Activity Tolerance: Patient Tolerated treatment well     Patient Diagnosis(es): The encounter diagnosis was Knee laceration, left, initial encounter. has a past medical history of Arthritis, Hyperlipidemia, and Hypertension. has a past surgical history that includes Cholecystectomy; Inguinal hernia repair (Right, 2016); and incision and drainage (Left, 2019). Restrictions  Restrictions/Precautions  Restrictions/Precautions: Fall Risk  Position Activity Restriction  Other position/activity restrictions: WBAT - knee immobilizer in place AATs  Subjective   General  Chart Reviewed:  Yes  Additional Pertinent Hx: Rodriguez Mistry MD \"80year-old white female who is still fairly active, sustained a fall directly over her left knee with a large deeplaceration measuring about 14 to 15 cm. The patient was admitted and taken to surgery on 6/25 by Dr. Ayaka Whiting for deep wound exploration with drainage, debridement, and removal of foreign body with closure. Postop the patient was started in therapies, but needs assist it is not safe to return to independent living. She is agreeable to rehab unit treatment, and has been cleared today for admission to our rehab unit to improve her transfers, gait, balance, and safety. Patient has a knee immobilizer in place, which hinders her progress in therapy. \"  Response To Previous Treatment: Patient with no complaints from previous session. Family / Caregiver Present: No  Referring Practitioner: Dr. Norma Gardner  Subjective  Subjective: Pt pleasant and agreeable to PT treatment. Denies pain. Patient request to use bathroom end of session. General Comment  Comments: goes by [employer]"          Orientation  Orientation  Overall Orientation Status: Within Functional Limits(BIMS 13/15)  Cognition      Objective   Bed mobility  Bridging: Independent  Rolling to Left: Independent  Rolling to Right: Independent  Supine to Sit: Independent  Sit to Supine: Independent  Scooting: Independent  Transfers  Sit to Stand: Modified independent  Stand to sit: Modified independent  Bed to Chair: Modified independent  Comment: cues  to extend LLE prior to transfers  Ambulation  Ambulation?: Yes  More Ambulation?: No  Ambulation 1  Surface: level tile  Device: Rolling Walker  Other Apparatus: Knee Immobilizer; Left  Assistance: Modified Independent  Quality of Gait: step through gait pattern; cues to stay within walker base and to slow down for safety, especially when turning, no LOB  Distance: 220' x 2 and short distances in therapy gym  Comments: cues to slow down  Stairs/Curb  Stairs?: Yes  Stairs  # Steps : 12  Stairs Height: 6\"  Rails: Bilateral  Curbs: 6\"(one trial)  Device: Rolling walker; No Device(no device steps, RW curb step)  Other Apparatus: (L knee immobilizer)  Assistance: Supervision;Stand by assistance(SBA-sup steps; SBA curb step)  Comment: pt recalled correct sequencing without cues but requires cues to slow down for safety; non-reciprocal gait pattern on steps        Exercises  Quad Sets: x20 LLE  Ankle Pumps: 20  Comments: ther ex seated in recliner         Other Activities: Other (see comment)  Comment: Patient request to use toilet with small amount of liquid BM in depends with then loose stool in toilet, therapist assist to change depends for the sake of time. Nurse, Mauricio Lopes, aware of loose stools. Patient able to perform pericare , brendan/doff pants. Second session  S/ Patient with no pain at present, during exercises c/o of L hip discomfort with SLR. O/ sit to stand with MI,  object with reacher with MI, transfer in/out of car with wheeled walker with MI. Patient to department, sit to supine with MI  Patient given and reviewed HEP in supine- GS, QS, Adductor sets x 15, AP, x 20, hip abduction x 15, attempt SLR with L LE but refused to L hip discomfort. Patient supine to sit with MI  Patient ambulated 26' with MI with wheeled walker and immobilizer. Patient in recliner with call light and chair alarm.   Goals  Short term goals  Time Frame for Short term goals: 7 days from 6/28 or upon d/c  Short term goal 1: bed mobility indep met 6/29  Short term goal 2: transfers mod I- met 7/3  Short term goal 3: ambulate 150' with RW and mod I- met 7/3  Short term goal 4: ascend/decend 12 steps with joe rails mod I- recommend supervision  Short term goal 5: ascend/descend curb step with RW mod I- recommend supervision  Long term goals  Time Frame for Long term goals : LTG=STG  Patient Goals   Patient goals : \"to be able to do everything I did before\"    Plan    Plan  Times per week: 5-6 days/wk  Times per day: Twice a day  Plan weeks: 1  Specific instructions for Next Treatment: increase activity as tolerated  Current Treatment Recommendations: Functional Mobility Training, Transfer Training, Gait Training, Positioning, Patient/Caregiver Education & Training, Strengthening, ROM, Balance Training, Endurance Training, Stair training, Neuromuscular Re-education, Safety Education & Training, Home Exercise Program  Safety Devices  Type of devices:  All fall risk precautions in place, Gait belt  Restraints  Initially in place: No     Therapy Time   Individual Concurrent Group Co-treatment   Time In 1115         Time Out 1200         Minutes 45         Timed Code Treatment Minutes: 39 Minutes    Second Session Therapy Time     Individual Co-treatment   Time In [phone]     Time Out [address], PT # [id number]

## 2019-07-03 NOTE — PLAN OF CARE
Ongoing  Note:   Patient denies of any pain during the shift. Will continue to monitor. 7/2/2019 2105 by Jacques Ponce RN  Outcome: Ongoing  Note:   Patient denies of any pain during the shift. Will continue to monitor. Problem: OXYGENATION/RESPIRATORY FUNCTION  Goal: Patient will achieve/maintain normal respiratory rate/effort  Description  Respiratory rate and effort will be within normal limits for the patient  7/3/2019 1028 by Sophie Barth RN  Outcome: Ongoing  7/3/2019 0849 by Jacques Ponce RN  Note:   Respiration regular and unlabored. Clear lungs sounds. Denies any SOB. O2 within normal range. Encourage deep breath and cough. Instructed to use IS. Will continue to monitor. Problem: HEMODYNAMIC STATUS  Goal: Patient has stable vital signs and fluid balance  Outcome: Ongoing     Problem: FLUID AND ELECTROLYTE IMBALANCE  Goal: Fluid and electrolyte balance are achieved/maintained  Outcome: Ongoing     Problem: ACTIVITY INTOLERANCE/IMPAIRED MOBILITY  Goal: Mobility/activity is maintained at optimum level for patient  7/3/2019 1028 by Sophie Barth RN  Outcome: Ongoing  7/3/2019 0849 by Jacques Ponce RN  Outcome: Ongoing  Note:   Encouraged frequent rest. Calm and quiet environment provided. Rounding every 2 hours. 7/2/2019 2105 by Jacques Ponce RN  Outcome: Ongoing  Note:   Encouraged frequent rest. Calm and quiet environment provided. Rounding every 2 hours.

## 2019-07-03 NOTE — PLAN OF CARE
Problem: Risk for Impaired Skin Integrity  Goal: Tissue integrity - skin and mucous membranes  Description  Structural intactness and normal physiological function of skin and  mucous membranes. 7/2/2019 2105 by Ashley Johnson RN  Outcome: Ongoing  Note:   Scattered bruising noted. Surgical incision to right knee with dressing/immobilizer intact. No new skin breakdown. Able to reposition self. Pillow support. Stocking removed. Clean and dry skin. Problem: Falls - Risk of:  Goal: Will remain free from falls  Description  Will remain free from falls  7/2/2019 2105 by Rm Han RN  Outcome: Ongoing  Note:   Pt AAO X4. Admitted with right knee laceration. Knee immobilizer. Free of fall. Transfers with assist of one, walker, and gait belt. Yellow socks on. Wheels locked. Bed lowest position. Alarm on. Call light, over the bed table, and personal belonging in reach. Hourly rounding. Patient instructed to call for needs or changes. Problem: ACTIVITY INTOLERANCE/IMPAIRED MOBILITY  Goal: Mobility/activity is maintained at optimum level for patient  7/2/2019 2105 by Rm Han RN  Outcome: Ongoing  Note:   Encouraged frequent rest. Calm and quiet environment provided. Rounding every 2 hours. Problem: Acute Pain  Goal: Control of acute pain  7/2/2019 2105 by Rm Han RN  Outcome: Ongoing  Note:   Patient denies of any pain during the shift. Will continue to monitor.

## 2019-07-03 NOTE — DISCHARGE SUMMARY
Occupational Therapy  Discharge Summary     Broderick Lyn  DGS:[id number]  :1927  Treatment Diagnosis: functional mobility below baseline s/p fall and L knee laceration  Diagnosis: L knee laceration    Restrictions/Precautions  Restrictions/Precautions: Fall Risk           Position Activity Restriction  Other position/activity restrictions: WBAT - knee immobilizer in place AATs     Goals:   Short term goals  Time Frame for Short term goals: 1 week pt will. Short term goal 1: bathe indep with AD - shower chair and grab bar, possibly long sponge--not met, requires SBA, set up + VCs, OT wraps L LE prior to shower, issued LH sponge  Short term goal 2: dress indep with AD if needed--goal met w/ except of KI--she required VCs to doff/don KI; shown A/E however declined to use, safety concerns when donning L shoe (stands to don)  Short term goal 3: toilet indep with AD--goal met using GB  Short term goal 4: transfers indep with AD--goal met for MI using RW, SAFETY CONCERNS  Short term goal 5: functional mobility and simple meal prep indep with AD - uses microwave at home   Long term goals  Time Frame for Long term goals : same as stg    Pt.  Met 4/5 short term goals     Current Functional Status:   ADL  Feeding: Modified independent (dentures)  Grooming: Modified independent (alternated standing & sitting to perform oral & hair care; applied make up in seated)  UE Bathing: Stand by assistance, Setup(sponge bathed while seated in bed, applied deodorant at sink)  LE Bathing: Stand by assistance, Setup, Verbal cueing(declined shower, willing to sponge bathe LEs in bed while KI doffed; stood at sink to sponge bathe allegra areas, set up & VCs provided)  UE Dressing: Independent(gathered clothes from closet, transported to bathrm; able to don bra & shirt while seated)  LE Dressing: Stand by assistance, Verbal cueing(pt required set up & VCs to doff/don KI, able to bend over to doff/don socks w/ extra time; SAFETY

## 2019-07-04 VITALS
BODY MASS INDEX: 27.29 KG/M2 | OXYGEN SATURATION: 95 % | TEMPERATURE: 97.5 F | HEART RATE: 83 BPM | DIASTOLIC BLOOD PRESSURE: 70 MMHG | WEIGHT: 163.8 LBS | HEIGHT: 65 IN | SYSTOLIC BLOOD PRESSURE: 149 MMHG | RESPIRATION RATE: 16 BRPM

## 2019-07-04 PROCEDURE — 6360000002 HC RX W HCPCS: Performed by: PHYSICAL MEDICINE & REHABILITATION

## 2019-07-04 PROCEDURE — 6370000000 HC RX 637 (ALT 250 FOR IP): Performed by: PHYSICAL MEDICINE & REHABILITATION

## 2019-07-04 PROCEDURE — 94760 N-INVAS EAR/PLS OXIMETRY 1: CPT

## 2019-07-04 RX ADMIN — ENOXAPARIN SODIUM 40 MG: 40 INJECTION SUBCUTANEOUS at 07:19

## 2019-07-04 RX ADMIN — LISINOPRIL 40 MG: 20 TABLET ORAL at 07:19

## 2019-07-04 ASSESSMENT — PAIN SCALES - GENERAL: PAINLEVEL_OUTOF10: 0

## 2019-07-04 NOTE — PLAN OF CARE
Problem: Risk for Impaired Skin Integrity  Goal: Tissue integrity - skin and mucous membranes  Description  Structural intactness and normal physiological function of skin and  mucous membranes. 7/4/2019 0836 by Jose Steele RN  Outcome: Ongoing  7/3/2019 2342 by Malka Orellana RN  Outcome: Ongoing  Note:   Skin assessment performed. Song score of 18. Encouraging and assisted with repositioning as needed. Heels elevated while in chair/bed. Use of waffle cushion while up to chair. L knee surgical incision clean, dry, and intact with some drainage, dressing changed. Problem: Falls - Risk of:  Goal: Will remain free from falls  Description  Will remain free from falls  7/4/2019 0836 by Jose Steele RN  Outcome: Ongoing  7/3/2019 2342 by Malka Orellana RN  Outcome: Ongoing  Note:   Fall precautions in place. Boyce score: 65. Pt transfers with walker/gaitbelt x1. Bed locked in lowest position, nonskid socks on, bed/chair alarm active. Call light and belongings within reach. Continue hourly rounding. Goal: Absence of physical injury  Description  Absence of physical injury  Outcome: Ongoing     Problem: Acute Pain  Goal: Control of acute pain  7/4/2019 0836 by Jose Steele RN  Outcome: Ongoing  7/3/2019 2342 by Malka Orellana RN  Outcome: Ongoing  Note:   Pt. able to communicate pain needs based on 0-10 pain scale. Non-pharmacological interventions, such as repositioning and rest encouraged, and PRN pain medications administered as needed. Continue to monitor and assess.         Problem: OXYGENATION/RESPIRATORY FUNCTION  Goal: Patient will achieve/maintain normal respiratory rate/effort  Description  Respiratory rate and effort will be within normal limits for the patient  Outcome: Ongoing     Problem: HEMODYNAMIC STATUS  Goal: Patient has stable vital signs and fluid balance  Outcome: Ongoing     Problem: FLUID AND ELECTROLYTE IMBALANCE  Goal: Fluid and electrolyte balance are

## 2019-07-04 NOTE — DISCHARGE SUMMARY
safety  Distance Walked: [address]: 0 - Activity Not Assessed/Does Not Occur  Stairs: 5- Supervision Requires supervision(e.g., standing by, cuing, or coaxing) to go up and down one flight of stairs, PT Equipment Recommendations  Equipment Needed: No  Other: pt has RW and 4WW, Assessment: Pt has met 3/5 LTG. Patient is ambulating community distances with RW with MI including transfers. She does require cues to slow down for safety during mobility, but no LOB. patient independent with bed mobility. Performs 12 steps with rails with supervision as well as  curb step with wheeled walker, occasional cues for sequencing steps. D/C for return home to indep living on  with home health. Occupational therapy: Eatin - Feeds self with adaptive equipment/dentures and/or feeds self with modified diet and/or performs own tube feeding  Groomin - Independent with all tasks using assistive device  Bathin - Able to bathe all 10 areas with setup/sup/cues  Dressing-Upper: 7 - Patient independently dresses upper body  Dressing-Lower: 5 - Requires setup/supervision/cues and/or staff applies TEDS/prosthesis/brace only  Toiletin - Requires device (grab bar/walker/etc.)  Toilet Transfer: 6 - Independent with device (grab bar/walker/slide bar)  Shower Transfer: 5 - Supervision, set-up, cues,  , Assessment: Pt has met 4/5 goals w/ OT intervention. Pt completes mobility and transfers with MI d/t safety concerns; tends to be in a hurry & plop down when fatigued. pt performed sponge bathing w/ SB A to clean & dry L foot. Pt would benefit from reacher, [address] shoe horn & sponge & SA fbut pt declines to use. She is able to bend over however is unsafe to stand to don L shoe. OT highly recommends slip on shoes.  Plans DC on  --will return home w/ [place] services and support from [facility]    Speech therapy:  Comprehension: 5 - Patient understands basic needs (hungry/hot/pain)  Expression: 5 - Expresses anterior soft tissue swelling and soft tissue laceration. Patella Rhea. Patient Instructions: Follow-up visits: Patient will follow-up in Dr. Talha Webber office for the healing status of her knee incision in 1 to 2 weeks. Discharge Medications:     Medication List      START taking these medications    simvastatin 20 MG tablet  Commonly known as:  ZOCOR  Take 1 tablet by mouth nightly  Notes to patient:  For cholesterol. traMADol 50 MG tablet  Commonly known as:  ULTRAM  Take 1 tablet by mouth every 8 hours as needed (Pain level 1-5) for up to 7 days. Notes to patient:  For pain. CONTINUE taking these medications    amLODIPine 5 MG tablet  Commonly known as:  NORVASC  Notes to patient: For blood pressure. aspirin EC 81 MG EC tablet  Take 1 tablet by mouth 2 times daily for 14 days Please avoid missing doses. benazepril 40 MG tablet  Commonly known as:  LOTENSIN  Notes to patient: For blood pressure. calcium carbonate 500 MG Tabs tablet  Commonly known as:  OSCAL     sertraline 50 MG tablet  Commonly known as:  ZOLOFT  Notes to patient:  For anxiety.      vitamin B-12 500 MCG tablet  Commonly known as:  CYANOCOBALAMIN        STOP taking these medications    acetaminophen 325 MG tablet  Commonly known as:  AMINOFEN           Where to Get Your Medications      You can get these medications from any pharmacy    Bring a paper prescription for each of these medications  · simvastatin 20 MG tablet  · traMADol 50 MG tablet            I spent over 35 minutes on this discharge encounter between counseling, coordination of care, and medication reconciliation.         To comply with [facility] sara [id number]:   Discharge order placed in advance to facilitate patients discharge needs      Janina Moore MD, 7/4/2019, 9:54 AM

## 2019-07-09 ENCOUNTER — OFFICE VISIT (OUTPATIENT)
Dept: ORTHOPEDIC SURGERY | Age: 84
End: 2019-07-09
Payer: MEDICARE

## 2019-07-09 VITALS — RESPIRATION RATE: 16 BRPM | HEIGHT: 65 IN | BODY MASS INDEX: 27.16 KG/M2 | WEIGHT: 163 LBS

## 2019-07-09 DIAGNOSIS — S81.012A KNEE LACERATION, LEFT, INITIAL ENCOUNTER: Primary | ICD-10-CM

## 2019-07-09 PROCEDURE — 4040F PNEUMOC VAC/ADMIN/RCVD: CPT | Performed by: ORTHOPAEDIC SURGERY

## 2019-07-09 PROCEDURE — 1036F TOBACCO NON-USER: CPT | Performed by: ORTHOPAEDIC SURGERY

## 2019-07-09 PROCEDURE — 1111F DSCHRG MED/CURRENT MED MERGE: CPT | Performed by: ORTHOPAEDIC SURGERY

## 2019-07-09 PROCEDURE — 99213 OFFICE O/P EST LOW 20 MIN: CPT | Performed by: ORTHOPAEDIC SURGERY

## 2019-07-09 PROCEDURE — G8419 CALC BMI OUT NRM PARAM NOF/U: HCPCS | Performed by: ORTHOPAEDIC SURGERY

## 2019-07-09 PROCEDURE — 1123F ACP DISCUSS/DSCN MKR DOCD: CPT | Performed by: ORTHOPAEDIC SURGERY

## 2019-07-09 PROCEDURE — G8427 DOCREV CUR MEDS BY ELIG CLIN: HCPCS | Performed by: ORTHOPAEDIC SURGERY

## 2019-07-09 PROCEDURE — 1090F PRES/ABSN URINE INCON ASSESS: CPT | Performed by: ORTHOPAEDIC SURGERY

## 2019-07-10 ENCOUNTER — TELEPHONE (OUTPATIENT)
Dept: ORTHOPEDIC SURGERY | Age: 84
End: 2019-07-10

## 2019-07-10 NOTE — TELEPHONE ENCOUNTER
Blanca Carter called back gave him [facility] regarding knee immobilizer and exercises.

## 2019-07-10 NOTE — TELEPHONE ENCOUNTER
Called and left message for [facility] with care connections. **patient can d/c knee immobilizer.  She may work on passive and active ROM exceeding no more than 30-40 degrees flexion**

## 2019-07-23 ENCOUNTER — OFFICE VISIT (OUTPATIENT)
Dept: ORTHOPEDIC SURGERY | Age: 84
End: 2019-07-23
Payer: MEDICARE

## 2019-07-23 VITALS — RESPIRATION RATE: 16 BRPM | BODY MASS INDEX: 27.16 KG/M2 | HEIGHT: 65 IN | HEART RATE: 100 BPM | WEIGHT: 163 LBS

## 2019-07-23 DIAGNOSIS — S81.012A KNEE LACERATION, LEFT, INITIAL ENCOUNTER: Primary | ICD-10-CM

## 2019-07-23 PROCEDURE — 1111F DSCHRG MED/CURRENT MED MERGE: CPT | Performed by: ORTHOPAEDIC SURGERY

## 2019-07-23 PROCEDURE — 1123F ACP DISCUSS/DSCN MKR DOCD: CPT | Performed by: ORTHOPAEDIC SURGERY

## 2019-07-23 PROCEDURE — 4040F PNEUMOC VAC/ADMIN/RCVD: CPT | Performed by: ORTHOPAEDIC SURGERY

## 2019-07-23 PROCEDURE — G8427 DOCREV CUR MEDS BY ELIG CLIN: HCPCS | Performed by: ORTHOPAEDIC SURGERY

## 2019-07-23 PROCEDURE — G8419 CALC BMI OUT NRM PARAM NOF/U: HCPCS | Performed by: ORTHOPAEDIC SURGERY

## 2019-07-23 PROCEDURE — 99213 OFFICE O/P EST LOW 20 MIN: CPT | Performed by: ORTHOPAEDIC SURGERY

## 2019-07-23 PROCEDURE — 1090F PRES/ABSN URINE INCON ASSESS: CPT | Performed by: ORTHOPAEDIC SURGERY

## 2019-07-23 PROCEDURE — 1036F TOBACCO NON-USER: CPT | Performed by: ORTHOPAEDIC SURGERY

## 2020-05-28 ENCOUNTER — HOSPITAL ENCOUNTER (EMERGENCY)
Age: 85
Discharge: HOME | End: 2020-05-28
Payer: MEDICARE

## 2020-05-28 ENCOUNTER — APPOINTMENT (OUTPATIENT)
Dept: GENERAL RADIOLOGY | Age: 85
End: 2020-05-28
Payer: MEDICARE

## 2020-05-28 VITALS
SYSTOLIC BLOOD PRESSURE: 175 MMHG | WEIGHT: 158.73 LBS | TEMPERATURE: 98.3 F | BODY MASS INDEX: 25.51 KG/M2 | RESPIRATION RATE: 18 BRPM | DIASTOLIC BLOOD PRESSURE: 84 MMHG | OXYGEN SATURATION: 97 % | HEIGHT: 66 IN | HEART RATE: 85 BPM

## 2020-05-28 PROCEDURE — 90471 IMMUNIZATION ADMIN: CPT | Performed by: PHYSICIAN ASSISTANT

## 2020-05-28 PROCEDURE — 99282 EMERGENCY DEPT VISIT SF MDM: CPT

## 2020-05-28 PROCEDURE — 12007 RPR S/N/AX/GEN/TRNK >30.0 CM: CPT

## 2020-05-28 PROCEDURE — 90715 TDAP VACCINE 7 YRS/> IM: CPT | Performed by: PHYSICIAN ASSISTANT

## 2020-05-28 PROCEDURE — 6360000002 HC RX W HCPCS: Performed by: PHYSICIAN ASSISTANT

## 2020-05-28 RX ADMIN — TETANUS TOXOID, REDUCED DIPHTHERIA TOXOID AND ACELLULAR PERTUSSIS VACCINE, ADSORBED 0.5 ML: 5; 2.5; 8; 8; 2.5 SUSPENSION INTRAMUSCULAR at 19:44

## 2020-05-28 ASSESSMENT — ENCOUNTER SYMPTOMS
RHINORRHEA: 0
SHORTNESS OF BREATH: 0
CONSTIPATION: 0
COUGH: 0
EYE DISCHARGE: 0
EYE REDNESS: 0
VOMITING: 0
SINUS PAIN: 0
ABDOMINAL PAIN: 0
CHEST TIGHTNESS: 0
SORE THROAT: 0
DIARRHEA: 0
SINUS PRESSURE: 0
NAUSEA: 0

## 2020-05-28 NOTE — ED PROVIDER NOTES
foreign bodies were identified. It was closed with tissue adhesive and sturry strips. There were no complications during the procedure. EMERGENCY DEPARTMENT COURSE and DIFFERENTIAL DIAGNOSIS/MDM:   Vitals:    Vitals:    05/28/20 1826 05/28/20 2000   BP: (!) 174/87 (!) 175/84   Pulse: 89 85   Resp: 16 18   Temp: 98.5 °F (36.9 °C) 98.3 °F (36.8 °C)   TempSrc: Oral Oral   SpO2: 97% 97%   Weight: 158 lb 11.7 oz (72 kg)    Height: 5' 6\" (1.676 m)        Patient was given the following medications:  Medications   Tetanus-Diphth-Acell Pertussis (BOOSTRIX) injection 0.5 mL (0.5 mLs Intramuscular Given 5/28/20 1944)         Afebrile, stable, patient presents to the ED for evaluation. Patient is alert and oriented x3. She adamantly denies hitting her head or any loss of consciousness. Patient refused any x-ray images she has full range of motion and neurovascularly intact to her right upper extremity and left lower extremity. She is a capacity to make this decision and we did discuss the usual facility of imaging especially as patient is on aspirin patient is a DNR CC and jokes about her falling. She does admit that she was not using her walker which she typically ambulates with. Patient's SPO2 on room air of 97%. The bleeding is under control I did clean her wounds and then repair them with tissue adhesive and Steri-Strips. Pt refused any imaging. All questions are answered. Indications for return to the ED are discussed. Patient is advised if any new or worsening symptoms arise they should immediately return to the emergency room. Follow-up with primary care in 1-2 days. The patient tolerated their visit well. The patient and / or the family were informed of the results of any tests, a time was given to answer questions, a plan was proposed and they agreed [place]. No results found for this visit on 05/28/20.     I estimate there is LOW risk for ACUTE CORONARY SYNDROME, INTRACRANIAL HEMORRHAGE,

## 2020-05-28 NOTE — ED TRIAGE NOTES
Patient arrived via EMS, from [facility]. C/o tripped and fell, laceration to right elbow and abrasion to right knee. No LOC. No head injury. Takes daily aspirin. No c/o  Pain. No respiratory distress. Will continue to monitor.

## 2022-03-09 ENCOUNTER — APPOINTMENT (OUTPATIENT)
Dept: GENERAL RADIOLOGY | Age: 87
DRG: 640 | End: 2022-03-09
Payer: MEDICARE

## 2022-03-09 ENCOUNTER — HOSPITAL ENCOUNTER (INPATIENT)
Age: 87
LOS: 2 days | Discharge: HOME HEALTH | DRG: 640 | End: 2022-03-11
Attending: EMERGENCY MEDICINE | Admitting: INTERNAL MEDICINE
Payer: MEDICARE

## 2022-03-09 DIAGNOSIS — D64.9 ANEMIA, UNSPECIFIED TYPE: ICD-10-CM

## 2022-03-09 DIAGNOSIS — R53.83 FATIGUE, UNSPECIFIED TYPE: Primary | ICD-10-CM

## 2022-03-09 DIAGNOSIS — R79.89 ELEVATED BRAIN NATRIURETIC PEPTIDE (BNP) LEVEL: ICD-10-CM

## 2022-03-09 DIAGNOSIS — R77.8 ELEVATED TROPONIN: ICD-10-CM

## 2022-03-09 LAB
A/G RATIO: 1.2 (ref 1.1–2.2)
ALBUMIN SERPL-MCNC: 3.7 G/DL (ref 3.4–5)
ALP BLD-CCNC: 116 U/L (ref 40–129)
ALT SERPL-CCNC: 29 U/L (ref 10–40)
ANION GAP SERPL CALCULATED.3IONS-SCNC: 18 MMOL/L (ref 3–16)
AST SERPL-CCNC: 43 U/L (ref 15–37)
BASOPHILS ABSOLUTE: 0 K/UL (ref 0–0.2)
BASOPHILS RELATIVE PERCENT: 0.5 %
BILIRUB SERPL-MCNC: 0.5 MG/DL (ref 0–1)
BUN BLDV-MCNC: 13 MG/DL (ref 7–20)
CALCIUM SERPL-MCNC: 8.8 MG/DL (ref 8.3–10.6)
CHLORIDE BLD-SCNC: 98 MMOL/L (ref 99–110)
CO2: 17 MMOL/L (ref 21–32)
CREAT SERPL-MCNC: 0.9 MG/DL (ref 0.6–1.2)
EOSINOPHILS ABSOLUTE: 0.1 K/UL (ref 0–0.6)
EOSINOPHILS RELATIVE PERCENT: 1.2 %
FERRITIN: 80 NG/ML (ref 15–150)
FOLATE: 10.89 NG/ML (ref 4.78–24.2)
GFR AFRICAN AMERICAN: >60
GFR NON-AFRICAN AMERICAN: 58
GLUCOSE BLD-MCNC: 125 MG/DL (ref 70–99)
HCT VFR BLD CALC: 35.3 % (ref 36–48)
HEMOGLOBIN: 11.3 G/DL (ref 12–16)
IRON SATURATION: 7 % (ref 15–50)
IRON: 22 UG/DL (ref 37–145)
LIPASE: 50 U/L (ref 13–60)
LYMPHOCYTES ABSOLUTE: 0.6 K/UL (ref 1–5.1)
LYMPHOCYTES RELATIVE PERCENT: 10.6 %
MCH RBC QN AUTO: 24.9 PG (ref 26–34)
MCHC RBC AUTO-ENTMCNC: 31.9 G/DL (ref 31–36)
MCV RBC AUTO: 78 FL (ref 80–100)
MONOCYTES ABSOLUTE: 0.4 K/UL (ref 0–1.3)
MONOCYTES RELATIVE PERCENT: 7.9 %
NEUTROPHILS ABSOLUTE: 4.4 K/UL (ref 1.7–7.7)
NEUTROPHILS RELATIVE PERCENT: 79.8 %
PDW BLD-RTO: 20.5 % (ref 12.4–15.4)
PLATELET # BLD: 149 K/UL (ref 135–450)
PMV BLD AUTO: 9.1 FL (ref 5–10.5)
POTASSIUM REFLEX MAGNESIUM: 3.9 MMOL/L (ref 3.5–5.1)
PRO-BNP: 787 PG/ML (ref 0–449)
RBC # BLD: 4.53 M/UL (ref 4–5.2)
SODIUM BLD-SCNC: 133 MMOL/L (ref 136–145)
TOTAL IRON BINDING CAPACITY: 310 UG/DL (ref 260–445)
TOTAL PROTEIN: 6.8 G/DL (ref 6.4–8.2)
TROPONIN: 0.02 NG/ML
TROPONIN: 0.03 NG/ML
TSH REFLEX: 2.71 UIU/ML (ref 0.27–4.2)
VITAMIN B-12: >2000 PG/ML (ref 211–911)
WBC # BLD: 5.5 K/UL (ref 4–11)

## 2022-03-09 PROCEDURE — 83690 ASSAY OF LIPASE: CPT

## 2022-03-09 PROCEDURE — 6360000002 HC RX W HCPCS: Performed by: INTERNAL MEDICINE

## 2022-03-09 PROCEDURE — 80053 COMPREHEN METABOLIC PANEL: CPT

## 2022-03-09 PROCEDURE — 1200000000 HC SEMI PRIVATE

## 2022-03-09 PROCEDURE — 93005 ELECTROCARDIOGRAM TRACING: CPT | Performed by: EMERGENCY MEDICINE

## 2022-03-09 PROCEDURE — 82746 ASSAY OF FOLIC ACID SERUM: CPT

## 2022-03-09 PROCEDURE — 2580000003 HC RX 258: Performed by: EMERGENCY MEDICINE

## 2022-03-09 PROCEDURE — 82728 ASSAY OF FERRITIN: CPT

## 2022-03-09 PROCEDURE — 71045 X-RAY EXAM CHEST 1 VIEW: CPT

## 2022-03-09 PROCEDURE — G0378 HOSPITAL OBSERVATION PER HR: HCPCS

## 2022-03-09 PROCEDURE — 36415 COLL VENOUS BLD VENIPUNCTURE: CPT

## 2022-03-09 PROCEDURE — 85025 COMPLETE CBC W/AUTO DIFF WBC: CPT

## 2022-03-09 PROCEDURE — 83540 ASSAY OF IRON: CPT

## 2022-03-09 PROCEDURE — 84443 ASSAY THYROID STIM HORMONE: CPT

## 2022-03-09 PROCEDURE — 83550 IRON BINDING TEST: CPT

## 2022-03-09 PROCEDURE — 2580000003 HC RX 258: Performed by: INTERNAL MEDICINE

## 2022-03-09 PROCEDURE — 6370000000 HC RX 637 (ALT 250 FOR IP): Performed by: INTERNAL MEDICINE

## 2022-03-09 PROCEDURE — 99282 EMERGENCY DEPT VISIT SF MDM: CPT

## 2022-03-09 PROCEDURE — 82607 VITAMIN B-12: CPT

## 2022-03-09 PROCEDURE — 83880 ASSAY OF NATRIURETIC PEPTIDE: CPT

## 2022-03-09 PROCEDURE — 84484 ASSAY OF TROPONIN QUANT: CPT

## 2022-03-09 RX ORDER — CLONIDINE HYDROCHLORIDE 0.1 MG/1
0.1 TABLET ORAL 2 TIMES DAILY
COMMUNITY

## 2022-03-09 RX ORDER — LANOLIN ALCOHOL/MO/W.PET/CERES
9 CREAM (GRAM) TOPICAL NIGHTLY
Status: DISCONTINUED | OUTPATIENT
Start: 2022-03-09 | End: 2022-03-11 | Stop reason: HOSPADM

## 2022-03-09 RX ORDER — ACETAMINOPHEN 325 MG/1
650 TABLET ORAL EVERY 6 HOURS PRN
Status: DISCONTINUED | OUTPATIENT
Start: 2022-03-09 | End: 2022-03-11 | Stop reason: HOSPADM

## 2022-03-09 RX ORDER — DOCUSATE SODIUM 100 MG/1
100 CAPSULE, LIQUID FILLED ORAL DAILY PRN
Status: DISCONTINUED | OUTPATIENT
Start: 2022-03-09 | End: 2022-03-11 | Stop reason: HOSPADM

## 2022-03-09 RX ORDER — POTASSIUM CHLORIDE 7.45 MG/ML
10 INJECTION INTRAVENOUS PRN
Status: DISCONTINUED | OUTPATIENT
Start: 2022-03-09 | End: 2022-03-11 | Stop reason: HOSPADM

## 2022-03-09 RX ORDER — SODIUM CHLORIDE 0.9 % (FLUSH) 0.9 %
10 SYRINGE (ML) INJECTION EVERY 12 HOURS SCHEDULED
Status: DISCONTINUED | OUTPATIENT
Start: 2022-03-09 | End: 2022-03-11 | Stop reason: HOSPADM

## 2022-03-09 RX ORDER — ASPIRIN 81 MG/1
81 TABLET ORAL DAILY
Status: DISCONTINUED | OUTPATIENT
Start: 2022-03-09 | End: 2022-03-11 | Stop reason: HOSPADM

## 2022-03-09 RX ORDER — AMLODIPINE BESYLATE 10 MG/1
10 TABLET ORAL DAILY
Status: DISCONTINUED | OUTPATIENT
Start: 2022-03-09 | End: 2022-03-11 | Stop reason: HOSPADM

## 2022-03-09 RX ORDER — PROMETHAZINE HYDROCHLORIDE 25 MG/1
12.5 TABLET ORAL EVERY 6 HOURS PRN
Status: DISCONTINUED | OUTPATIENT
Start: 2022-03-09 | End: 2022-03-09

## 2022-03-09 RX ORDER — TRAZODONE HYDROCHLORIDE 50 MG/1
50 TABLET ORAL NIGHTLY
COMMUNITY

## 2022-03-09 RX ORDER — SODIUM CHLORIDE 9 MG/ML
25 INJECTION, SOLUTION INTRAVENOUS PRN
Status: DISCONTINUED | OUTPATIENT
Start: 2022-03-09 | End: 2022-03-11 | Stop reason: HOSPADM

## 2022-03-09 RX ORDER — HYDRALAZINE HYDROCHLORIDE 25 MG/1
25 TABLET, FILM COATED ORAL 3 TIMES DAILY
Status: ON HOLD | COMMUNITY
End: 2022-03-10

## 2022-03-09 RX ORDER — PROCHLORPERAZINE EDISYLATE 5 MG/ML
10 INJECTION INTRAMUSCULAR; INTRAVENOUS EVERY 6 HOURS PRN
Status: DISCONTINUED | OUTPATIENT
Start: 2022-03-09 | End: 2022-03-11 | Stop reason: HOSPADM

## 2022-03-09 RX ORDER — ACETAMINOPHEN 650 MG/1
650 SUPPOSITORY RECTAL EVERY 6 HOURS PRN
Status: DISCONTINUED | OUTPATIENT
Start: 2022-03-09 | End: 2022-03-11 | Stop reason: HOSPADM

## 2022-03-09 RX ORDER — LACTOBACILLUS RHAMNOSUS GG 10B CELL
1 CAPSULE ORAL DAILY
Status: DISCONTINUED | OUTPATIENT
Start: 2022-03-09 | End: 2022-03-11 | Stop reason: HOSPADM

## 2022-03-09 RX ORDER — CLONIDINE HYDROCHLORIDE 0.1 MG/1
0.1 TABLET ORAL 2 TIMES DAILY
Status: DISCONTINUED | OUTPATIENT
Start: 2022-03-09 | End: 2022-03-11 | Stop reason: HOSPADM

## 2022-03-09 RX ORDER — LOPERAMIDE HYDROCHLORIDE 2 MG/1
2 CAPSULE ORAL 4 TIMES DAILY PRN
Status: DISCONTINUED | OUTPATIENT
Start: 2022-03-09 | End: 2022-03-11 | Stop reason: HOSPADM

## 2022-03-09 RX ORDER — POTASSIUM CHLORIDE 20 MEQ/1
40 TABLET, EXTENDED RELEASE ORAL PRN
Status: DISCONTINUED | OUTPATIENT
Start: 2022-03-09 | End: 2022-03-11 | Stop reason: HOSPADM

## 2022-03-09 RX ORDER — MAGNESIUM SULFATE IN WATER 40 MG/ML
2000 INJECTION, SOLUTION INTRAVENOUS PRN
Status: DISCONTINUED | OUTPATIENT
Start: 2022-03-09 | End: 2022-03-11 | Stop reason: HOSPADM

## 2022-03-09 RX ORDER — TRAZODONE HYDROCHLORIDE 50 MG/1
50 TABLET ORAL NIGHTLY
Status: DISCONTINUED | OUTPATIENT
Start: 2022-03-09 | End: 2022-03-09

## 2022-03-09 RX ORDER — AMLODIPINE BESYLATE 10 MG/1
10 TABLET ORAL DAILY
COMMUNITY

## 2022-03-09 RX ORDER — LOPERAMIDE HYDROCHLORIDE 2 MG/1
2 CAPSULE ORAL 4 TIMES DAILY PRN
COMMUNITY

## 2022-03-09 RX ORDER — ONDANSETRON 2 MG/ML
4 INJECTION INTRAMUSCULAR; INTRAVENOUS EVERY 6 HOURS PRN
Status: DISCONTINUED | OUTPATIENT
Start: 2022-03-09 | End: 2022-03-09

## 2022-03-09 RX ORDER — LACTOBACILLUS RHAMNOSUS GG 10B CELL
1 CAPSULE ORAL DAILY
COMMUNITY

## 2022-03-09 RX ORDER — 0.9 % SODIUM CHLORIDE 0.9 %
250 INTRAVENOUS SOLUTION INTRAVENOUS ONCE
Status: COMPLETED | OUTPATIENT
Start: 2022-03-09 | End: 2022-03-09

## 2022-03-09 RX ORDER — ASPIRIN 81 MG/1
81 TABLET ORAL DAILY
COMMUNITY

## 2022-03-09 RX ORDER — CHOLECALCIFEROL (VITAMIN D3) 125 MCG
500 CAPSULE ORAL DAILY
Status: DISCONTINUED | OUTPATIENT
Start: 2022-03-09 | End: 2022-03-11 | Stop reason: HOSPADM

## 2022-03-09 RX ORDER — DOCUSATE SODIUM 100 MG/1
100 CAPSULE, LIQUID FILLED ORAL DAILY PRN
COMMUNITY

## 2022-03-09 RX ORDER — CHOLECALCIFEROL (VITAMIN D3) 125 MCG
10 CAPSULE ORAL NIGHTLY
COMMUNITY

## 2022-03-09 RX ORDER — SODIUM CHLORIDE 0.9 % (FLUSH) 0.9 %
10 SYRINGE (ML) INJECTION PRN
Status: DISCONTINUED | OUTPATIENT
Start: 2022-03-09 | End: 2022-03-11 | Stop reason: HOSPADM

## 2022-03-09 RX ORDER — SODIUM CHLORIDE 9 MG/ML
INJECTION, SOLUTION INTRAVENOUS CONTINUOUS
Status: DISCONTINUED | OUTPATIENT
Start: 2022-03-09 | End: 2022-03-11 | Stop reason: HOSPADM

## 2022-03-09 RX ORDER — HYDRALAZINE HYDROCHLORIDE 25 MG/1
25 TABLET, FILM COATED ORAL 3 TIMES DAILY
Status: DISCONTINUED | OUTPATIENT
Start: 2022-03-09 | End: 2022-03-11 | Stop reason: HOSPADM

## 2022-03-09 RX ADMIN — CLONIDINE HYDROCHLORIDE 0.1 MG: 0.1 TABLET ORAL at 21:46

## 2022-03-09 RX ADMIN — CYANOCOBALAMIN TAB 500 MCG 500 MCG: 500 TAB at 21:46

## 2022-03-09 RX ADMIN — ENOXAPARIN SODIUM 40 MG: 100 INJECTION SUBCUTANEOUS at 21:47

## 2022-03-09 RX ADMIN — SODIUM CHLORIDE 250 ML: 9 INJECTION, SOLUTION INTRAVENOUS at 15:30

## 2022-03-09 RX ADMIN — SODIUM CHLORIDE: 9 INJECTION, SOLUTION INTRAVENOUS at 21:44

## 2022-03-09 RX ADMIN — Medication 9 MG: at 21:46

## 2022-03-09 RX ADMIN — Medication 1 CAPSULE: at 21:45

## 2022-03-09 ASSESSMENT — PAIN SCALES - GENERAL
PAINLEVEL_OUTOF10: 0
PAINLEVEL_OUTOF10: 0

## 2022-03-09 NOTE — PROGRESS NOTES
Medication Reconciliation     List of medications patient is currently taking is complete. Source of information:   [facility]  2. EPIC records        Notes regarding home medications:  1. Per nurse pt only received hydralazine PTA  2. Trazodone was snot on list but it was in epic. Nurse states this was just ordered yesterday.   Juan Christine, Pharmacy Intern 3/9/2022 3:43 PM

## 2022-03-09 NOTE — CARE COORDINATION
INITIAL CASE MANAGEMENT ASSESSMENT    Reviewed chart, met with patient  And her son Jemal Alvarez to assess possible discharge needs. Explained Case Management role/services. Living Situation: Lives at [facility] in [facility] [address] [facility] wait to get back [facility]. ADLs: assisted by staff at [facility] as needed. DME: has 4 wheeled walker and Rollator, has emergency button. PT/OT Recs: TBD     Active Services: Has [facility] when needed- not currently active with Home care. Transportation: [facility] as needed. Medications: Medications dispensed by staff at [facility] 2 times a day. PCP: Dr. Lyly Walden( MD @ [facility])      HD/PD: N/A    PLAN/COMMENTS: Patient hopes to return to [facility]. [facility] if home care needed. [facility]  Advanced care Planning completed- patient is DNR- CC. The Plan for Transition of Care is related to the following treatment goals: return home- [facility]    The Patient and/or patient representative - son Keli Crespo was provided with a choice of provider and agrees   with the discharge plan. [x] Yes [] No    Freedom of choice list was provided with basic dialogue that supports the patient's individualized plan of care/goals, treatment preferences and shares the quality data associated with the providers. [x] Yes [] No    SW/CM provided contact information for patient or family to call with any questions. SW/CM will follow and assist as needed.

## 2022-03-09 NOTE — ED NOTES
RN called to bedside by pt's family. Pt screaming \"I can't breathe. \" Pt had taken a sip of water provided by family and was \"laying back\" per family with head of bed not up. RN raised HOB, redirecting pt to try to slow her breathing down and calm down. Pt O2 sat on monitor remains at 100%, pt is following commands and remains alert. ED PA came to bedside as well. Lung sounds clear throughout. Pt states she just got worried and anxious thinking she could not breathe. Pt HOB remains high downs.       Edgardo Mckeon, MADHAVI  03/09/22 2023

## 2022-03-09 NOTE — ED NOTES
Remington Anne from [facility] calling to check on the pt. Remington Anne was updated with the POC. No further questions at this time.       Reshma Perez RN  03/09/22 5250

## 2022-03-09 NOTE — ED NOTES
Pt's daughter (medical POA) at bedside in ED states this is patient's normal mental status. Baseline dementia. Pt has not been sleeping at night and sleeping during the day.       Portillo Horne RN  03/09/22 Yovany Alcantara RN  03/09/22 0402

## 2022-03-09 NOTE — ACP (ADVANCE CARE PLANNING)
ADVANCED CARE PLANNING    Misael Palomo       :  1927              MRN:  [id number]    Purpose of Encounter: Advanced care planning in light of acute and chronic deteriorating medical conditions. Parties in attendance: :Abiodun Reed MD ( myself ), patient son at the bedside    POA : Patient's son at the bedside    Decisional Capacity: YES      Subjective: Patient/family understand in this voluntary conversation what inteventions and plans patient would want implemented in light of the following diagnoses:    Diagnosis: Chest pain, abnormal EKG    Discussion highlights: I discussed with patient advanced directives or impending END of life event in the light of above diagnosis, we discussed the needs for possible CPR, intubation/ventilator support if needed. In such an event patient wishes to remain DNR CCA    Goals of Care Determinations: Patient wishes DNR CCA but has interest in continuing this conversation, and discussing with family before making any changes to code status and goals of care.      Code Status: DNR CCA    Time Spent: 17 minutes    Electronically signed by Duran Jim MD on 3/9/2022 at 6:02 PM

## 2022-03-09 NOTE — H&P
Hospital Medicine History & Physical      PCP: Eddi Gordon MD    Date of Admission: 3/9/2022    Chief Complaint: Fatigue    History Of Present Illness:    Patient is a 68-year-old female with past medical history of hypertension hyperlipidemia who presents to the hospital after noticing several days of severe fatigue. According to the patient's family at the bedside patient has not been keeping up with hydration, patient had EKG performed at nursing home facility which came out abnormal so patient was sent to the emergency department for further evaluation. At time of my evaluation patient does not have any complaints, denies chest pain shortness of breath nausea vomiting diarrhea constipation dysuria, mentions she feels fatigued and exhausted. Past Medical History:          Diagnosis Date    Arthritis     Hyperlipidemia     Hypertension        Past Surgical History:          Procedure Laterality Date    CHOLECYSTECTOMY      INCISION AND DRAINAGE Left 6/25/2019    INCISION AND DRAINAGE LEFT KNEE performed by Jemal Josue MD at [address] Right 06/09/2016    incarcerated spigelian IHR with mesh       Medications Prior to Admission:      Prior to Admission medications    Medication Sig Start Date End Date Taking?  Authorizing Provider   amLODIPine (NORVASC) 10 MG tablet Take 10 mg by mouth daily   Yes Historical Provider, MD   aspirin 81 MG EC tablet Take 81 mg by mouth daily   Yes Historical Provider, MD   cloNIDine (CATAPRES) 0.1 MG tablet Take 0.1 mg by mouth 2 times daily   Yes Historical Provider, MD   lactobacillus (CULTURELLE) capsule Take 1 capsule by mouth daily   Yes Historical Provider, MD   melatonin 5 MG TABS tablet Take 10 mg by mouth nightly   Yes Historical Provider, MD   hydrALAZINE (APRESOLINE) 25 MG tablet Take 25 mg by mouth 3 times daily   Yes Historical Provider, MD   traZODone (DESYREL) 50 MG tablet Take 50 mg by mouth nightly   Yes Historical Provider, MD   docusate sodium (COLACE) 100 MG capsule Take 100 mg by mouth daily as needed for Constipation   Yes Historical Provider, MD   loperamide (IMODIUM) 2 MG capsule Take 2 mg by mouth 4 times daily as needed for Diarrhea   Yes Historical Provider, MD   sertraline (ZOLOFT) 50 MG tablet Take 75 mg by mouth nightly    Yes Historical Provider, MD   vitamin B-12 (CYANOCOBALAMIN) 500 MCG tablet Take 500 mcg by mouth daily   Yes Historical Provider, MD   benazepril (LOTENSIN) 40 MG tablet Take 40 mg by mouth daily    Yes Historical Provider, MD   aspirin EC 81 MG EC tablet Take 1 tablet by mouth 2 times daily for 14 days Please avoid missing doses. 6/26/19 7/10/19  Yandel Nicole 89, APRN - CNP       Allergies:  Patient has no known allergies. Social History:      TOBACCO:   reports that she has never smoked. She has never used smokeless tobacco.  ETOH:   reports current alcohol use. Family History:       Reviewed in detail and non contributory      No family history on file. REVIEW OF SYSTEMS:   Pertinent positives as noted in the HPI. All other systems reviewed and negative. PHYSICAL EXAM PERFORMED:    BP (!) 176/95   Pulse 76   Temp 98.9 °F (37.2 °C) (Oral)   Resp 25   Wt 156 lb 4.9 oz (70.9 kg)   SpO2 100%   BMI 25.23 kg/m²     General appearance:  No apparent distress, cooperative. HEENT:  Normal cephalic, atraumatic without obvious deformity. Conjunctivae/corneas clear. Neck: Supple, with full range of motion. No cervical lymphadenopathy  Respiratory:  Normal respiratory effort. Clear to auscultation, bilaterally without Rales/Wheezes/Rhonchi. Cardiovascular:  Regular rate and rhythm with normal S1/S2 without murmurs, rubs or gallops. Abdomen: Soft, non-tender, non-distended, normal bowel sounds. Musculoskeletal:  No edema noted bilaterally. No tenderness on palpation   Skin: no rash visible  Neurologic:  Neurologically intact without any focal sensory/motor deficits.   grossly non-focal.  Psychiatric:  Alert and oriented, normal mood  Peripheral Pulses: +2 palpable, equal bilaterally       Labs:     Recent Labs     03/09/22  1410   WBC 5.5   HGB 11.3*   HCT 35.3*        Recent Labs     03/09/22  1410   *   K 3.9   CL 98*   CO2 17*   BUN 13   CREATININE 0.9   CALCIUM 8.8     Recent Labs     03/09/22  1410   AST 43*   ALT 29   BILITOT 0.5   ALKPHOS 116     No results for input(s): INR in the last 72 hours. Recent Labs     03/09/22  1410   TROPONINI 0.03*       Urinalysis:    No results found for: Martinez Parent, BACTERIA, RBCUA, BLOODU, SPECGRAV, GLUCOSEU    Radiology:       XR CHEST PORTABLE   Final Result   1. No acute abnormality. Active Hospital Problems    Diagnosis Date Noted    Elevated troponin [R77.8] 03/09/2022         Patient is a 70-year-old female with past medical history of hypertension hyperlipidemia who presents to the hospital after noticing several days of severe fatigue. According to the patient's family at the bedside patient has not been keeping up with hydration, patient had EKG performed at nursing home facility which came out abnormal so patient was sent to the emergency department for further evaluation. At time of my evaluation patient does not have any complaints, denies chest pain shortness of breath nausea vomiting diarrhea constipation dysuria, mentions she feels fatigued and exhausted.     Assessment  Abnormal EKG, elevated troponin, rule out ACS  fatigue, hyponatremia anion gap metabolic acidosis likely dehydration  Microcytic anemia  Hypertension  Hyperlipidemia    Plan  Monitor on cardiac telemetry, monitor troponin, patient is DNR CCA, cardiology consulted from emergency department, follow-up on recommendations  Monitor replace electrolytes, gentle IV fluid therapy with normal saline  Check iron level, ferritin, iron saturation, L32 level, folic acid  Resume home medications  DVT prophylaxis-Lovenox  Diet: ADULT DIET; Regular  Code Status: DNR-CCA    PT/OT Eval Status: ordered    Dispo - pending clinical improvement       Caitlin Sommer MD    The note was completed using EMR and Dragon dictation system. Every effort was made to ensure accuracy; however, inadvertent computerized transcription errors may be present. Thank you Deepthi Ortega MD for the opportunity to be involved in this patient's care. If you have any questions or concerns please feel free to contact me at [phone].     Caitlin Sommer MD

## 2022-03-09 NOTE — ED PROVIDER NOTES
[address] [place] [address]  eMERGENCY dEPARTMENT eNCOUnter      Pt Name: Nathanael Dumont  MRN: [id number]  [place] 5/30/1927  Date of evaluation: 3/9/2022  Provider: Simran Pringle MD    CHIEF COMPLAINT       Chief Complaint   Patient presents with    Chest Pain     sent from nursing home for r/o MI. per MD notes that arrived with patient's paperwork, EKG showed nonspecific T waves    Fatigue         HISTORY OF PRESENT ILLNESS   (Location/Symptom, Timing/Onset,Context/Setting, Quality, Duration, Modifying Factors, Severity)  Note limiting factors. Nathanael Dumont is a 80 y.o. female who presents to the emergency department with fatigue. This patient comes to us from a care facility. The patient states she thinks she has her days and nights mixed up and she has been sleeping very poorly for some time now. At first she states she does not know how long this has been going on, but other times she states has been going on for at least a week. Somewhere someone reported that the patient had been complaining of chest discomfort and an EKG was done at the care facility that reportedly showed some nonspecific ST wave changes. Brought him with the patient is an EKG dated 9 March 2020 [id number] with a heart rate of 90. It shows ST wave changes inferiorly. However, there is another rhythm strip dated 9 March 2013 28 at 49 seconds and it shows sinus with PACs and a possible left anterior fascicular block is a computer reading and there is no ST elevation on this EKG at all. The patient denies any chest pain, belly pain, shortness of breath. She denies any fever or chills. She states she just feels exhausted and has for about a week or so. HPI    NursingNotes were reviewed. REVIEW OF SYSTEMS    (2-9 systems for level 4, 10 or more for level 5)     Review of Systems   Constitutional: Positive for fatigue. Negative for fever. HENT: Negative for rhinorrhea and sore throat. Eyes: Negative for redness. Respiratory: Negative for shortness of breath and stridor. Cardiovascular: Negative for chest pain. Gastrointestinal: Negative for abdominal pain, nausea and vomiting. Genitourinary: Negative for dysuria, flank pain and frequency. Neurological: Negative for headaches. Hematological: Negative for adenopathy. Psychiatric/Behavioral: Negative for confusion. Except as noted above the remainder of the review of systems was reviewed and negative. PAST MEDICAL HISTORY     Past Medical History:   Diagnosis Date    Arthritis     Hyperlipidemia     Hypertension          SURGICALHISTORY       Past Surgical History:   Procedure Laterality Date    CHOLECYSTECTOMY      INCISION AND DRAINAGE Left 6/25/2019    INCISION AND DRAINAGE LEFT KNEE performed by Heddy Sacks, MD at [address] [place] [address] Right 06/09/2016    incarcerated spigelian IHR with mesh         CURRENT MEDICATIONS       Current Discharge Medication List      CONTINUE these medications which have NOT CHANGED    Details   pantoprazole (PROTONIX) 40 MG tablet Take 40 mg by mouth daily      hydrocortisone (PROCTOZONE-HC) 2.5 % CREA rectal cream Place rectally 4 times daily as needed for Hemorrhoids Apply topically. witch hazel-glycerin (TUCKS) pad Place rectally as needed for Hemorrhoids Place rectally as needed. amLODIPine (NORVASC) 10 MG tablet Take 10 mg by mouth daily      aspirin 81 MG EC tablet Take 81 mg by mouth daily      cloNIDine (CATAPRES) 0.1 MG tablet Take 0.1 mg by mouth 2 times daily      lactobacillus (CULTURELLE) capsule Take 1 capsule by mouth daily      melatonin 5 MG TABS tablet Take 10 mg by mouth nightly      traZODone (DESYREL) 50 MG tablet Take 50 mg by mouth nightly Take 1/2 tablet. May increase to full tablet if needed to promote sleep.       docusate sodium (COLACE) 100 MG capsule Take 100 mg by mouth daily as needed for Constipation      loperamide (IMODIUM) 2 MG capsule Take 2 mg by mouth 4 times daily as needed for Diarrhea      sertraline (ZOLOFT) 50 MG tablet Take 75 mg by mouth nightly       vitamin B-12 (CYANOCOBALAMIN) 500 MCG tablet Take 500 mcg by mouth daily      benazepril (LOTENSIN) 40 MG tablet Take 40 mg by mouth daily              ALLERGIES     Patient has no known allergies. FAMILY HISTORY     History reviewed. No pertinent family history. SOCIAL HISTORY       Social History     Socioeconomic History    Marital status:      Spouse name: None    Number of children: None    Years of education: None    Highest education level: None   Occupational History    None   Tobacco Use    Smoking status: Never Smoker    Smokeless tobacco: Never Used   Substance and Sexual Activity    Alcohol use: Yes     Comment: occ    Drug use: No    Sexual activity: None   Other Topics Concern    None   Social History Narrative    None     Social Determinants of Health     Financial Resource Strain:     Difficulty of Paying Living Expenses: Not on file   Food Insecurity:     Worried About Running Out of Food in the Last Year: Not on file    Petr of Food in the Last Year: Not on file   Transportation Needs:     Lack of Transportation (Medical): Not on file    Lack of Transportation (Non-Medical):  Not on file   Physical Activity:     Days of Exercise per Week: Not on file    Minutes of Exercise per Session: Not on file   Stress:     Feeling of Stress : Not on file   Social Connections:     Frequency of Communication with Friends and Family: Not on file    Frequency of Social Gatherings with Friends and Family: Not on file    Attends Episcopal Services: Not on file    Active Member of Clubs or Organizations: Not on file    Attends Club or Organization Meetings: Not on file    Marital Status: Not on file   Intimate Partner Violence:     Fear of Current or Ex-Partner: Not on file    Emotionally Abused: Not on file    Physically Abused: Not on file   [employer] Sexually Abused: Not on file   Housing Stability:     Unable to Pay for Housing in the Last Year: Not on file    Number of [place] in the Last Year: Not on file    Unstable Housing in the Last Year: Not on file       SCREENINGS    [place] Coma Scale  Eye Opening: Spontaneous  Best Verbal Response: Oriented  Best Motor Response: Obeys commands  [place] Coma Scale Score: 15        PHYSICAL EXAM    (up to 7 for level 4, 8 or more for level 5)     ED Triage Vitals   BP Temp Temp src Pulse Resp SpO2 Height Weight   -- -- -- -- -- -- -- --       Physical Exam  Vitals and nursing note reviewed. Constitutional:       Appearance: She is well-developed. She is not diaphoretic. HENT:      Head: Normocephalic and atraumatic. Mouth/Throat:      Mouth: Mucous membranes are moist.   Eyes:      General:         Right eye: No discharge. Left eye: No discharge. Conjunctiva/sclera: Conjunctivae normal.   Cardiovascular:      Rate and Rhythm: Normal rate. Heart sounds: No murmur heard. Pulmonary:      Effort: Pulmonary effort is normal. No respiratory distress. Breath sounds: Normal breath sounds. No wheezing, rhonchi or rales. Abdominal:      Palpations: Abdomen is soft. Tenderness: There is no abdominal tenderness. There is no guarding or rebound. Musculoskeletal:         General: Normal range of motion. Cervical back: Neck supple. Skin:     General: Skin is warm and dry. Capillary Refill: Capillary refill takes less than 2 seconds. Neurological:      Mental Status: She is alert and oriented to person, place, and time. Comments:  The patient can tell me her name, her date of birth, that she is at the hospital.   Psychiatric:         Behavior: Behavior normal.         DIAGNOSTIC RESULTS     EKG: All EKG's are interpreted by the Emergency Department Physician who either signs or Co-signsthis chart in the absence of a cardiologist.    The Ekg interpreted by me shows  normal sinus rhythm with a rate of 93  Axis is   Left axis deviation  QTc is  502 msec  Intervals and Durations are unremarkable. ST Segments: Nonspecific ST wave changes. These are not significantly changed from an EKG performed on June 25, 2019. Field ECGs brought with the patient                    RADIOLOGY:   Non-plain filmimages such as CT, Ultrasound and MRI are read by the radiologist. Plain radiographic images are visualized and preliminarily interpreted by the emergency physician with the below findings:        Interpretation per the Radiologist below, if available at the time ofthis note:    XR CHEST PORTABLE   Final Result   1. No acute abnormality.                ED BEDSIDE ULTRASOUND:   Performed by ED Physician - none    LABS:  Labs Reviewed   CBC WITH AUTO DIFFERENTIAL - Abnormal; Notable for the following components:       Result Value    Hemoglobin 11.3 (*)     Hematocrit 35.3 (*)     MCV 78.0 (*)     MCH 24.9 (*)     RDW 20.5 (*)     Lymphocytes Absolute 0.6 (*)     All other components within normal limits   COMPREHENSIVE METABOLIC PANEL W/ REFLEX TO MG FOR LOW K - Abnormal; Notable for the following components:    Sodium 133 (*)     Chloride 98 (*)     CO2 17 (*)     Anion Gap 18 (*)     Glucose 125 (*)     GFR Non- 58 (*)     AST 43 (*)     All other components within normal limits   TROPONIN - Abnormal; Notable for the following components:    Troponin 0.03 (*)     All other components within normal limits   BRAIN NATRIURETIC PEPTIDE - Abnormal; Notable for the following components:    Pro- (*)     All other components within normal limits   TROPONIN - Abnormal; Notable for the following components:    Troponin 0.02 (*)     All other components within normal limits   TROPONIN - Abnormal; Notable for the following components:    Troponin 0.03 (*)     All other components within normal limits   BASIC METABOLIC PANEL W/ REFLEX TO MG FOR LOW K - Abnormal; Notable for the following components:    Potassium reflex Magnesium 3.1 (*)     CO2 19 (*)     GFR Non-African American 52 (*)     Calcium 7.8 (*)     All other components within normal limits   CBC WITH AUTO DIFFERENTIAL - Abnormal; Notable for the following components:    WBC 3.8 (*)     RBC 3.49 (*)     Hemoglobin 8.6 (*)     Hematocrit 27.2 (*)     MCV 77.8 (*)     MCH 24.6 (*)     RDW 20.5 (*)     Lymphocytes Absolute 0.7 (*)     All other components within normal limits   VITAMIN B12 - Abnormal; Notable for the following components:    Vitamin B-12 >2000 (*)     All other components within normal limits    Narrative:     Collection has been rescheduled by Makayla Ruggiero at 03/09/2022 18:19 Reason: Add   on    IRON AND TIBC - Abnormal; Notable for the following components:    Iron 22 (*)     Iron Saturation 7 (*)     All other components within normal limits   LIPASE   FERRITIN   TSH WITH REFLEX   FOLATE    Narrative:     Collection has been rescheduled by Makayla Ruggiero at 03/09/2022 18:19 Reason: Add   on    [facility]       All other labs were within normal range or not returned as of this dictation. EMERGENCY DEPARTMENT COURSE and DIFFERENTIAL DIAGNOSIS/MDM:   Vitals:    Vitals:    03/10/22 0358 03/10/22 0615 03/10/22 0815 03/10/22 0927   BP:   (!) 164/64    Pulse:   83    Resp:   18    Temp:   98.9 °F (37.2 °C)    TempSrc:   Oral    SpO2: 94%  93% 94%   Weight:  149 lb 3.2 oz (67.7 kg)           MDM  Number of Diagnoses or Management Options  Anemia, unspecified type  Elevated troponin  Fatigue, unspecified type  Diagnosis management comments: DDX: Dehydration, anemia, non-ST wave elevation, unstable angina, UTI, other    I have included the rhythm strips from the field by way of photography below. There is no patient identifiers on them but they were in the patient's room in the record ID seems to match on both of those rhythm strips.   As such 1 could argue that the patient is having dynamic EKG changes. She is a DNR. Due to the possible dynamic EKG changes I will consult cardiology but given that the patient is a DNR I feel that they would be more inclined to medically manage the patient. Work-up of the patient shows a normal chest x-ray. EKG without STEMI x2.  proBNP is mildly elevated 787. Troponin 0 0.03. CMP is significant for CO2 of 17 so she may be mildly dehydrated. 250 mL of normal saline will be given. Her AST is 43. Patient has mild anemia which is not new according to the family. Given that the patient complains of fatigue and has an abnormal troponin I feel that she should be placed in the hospital for further evaluation. I have consult the hospitalist to have accepted this patient to their service. CRITICAL CARE TIME   Total Critical Care time was 0 minutes, excluding separately reportable procedures. There was a high probability of clinically significant/life threatening deterioration in the patient's condition which required my urgent intervention. CONSULTS:  IP CONSULT TO CARDIOLOGY  IP CONSULT TO SOCIAL WORK    PROCEDURES:  Unless otherwise noted below, none     Procedures    FINAL IMPRESSION      1. Fatigue, unspecified type    2. Elevated troponin    3. Anemia, unspecified type    4. Elevated brain natriuretic peptide (BNP) level          DISPOSITION/PLAN   DISPOSITION Admitted 03/09/2022 03:44:29 PM      PATIENT REFERRED TO:  No follow-up provider specified.     DISCHARGE MEDICATIONS:  Current Discharge Medication List             (Please note that portions of this note were completed with a voice recognition program.Efforts were made to edit the dictations but occasionally words are mis-transcribed.)    Rachel Marie MD (electronically signed)  Attending Emergency Physician          Rachel Marie MD  03/09/22 MD Yosvany  03/10/22 4368

## 2022-03-10 LAB
ANION GAP SERPL CALCULATED.3IONS-SCNC: 14 MMOL/L (ref 3–16)
BASOPHILS ABSOLUTE: 0 K/UL (ref 0–0.2)
BASOPHILS RELATIVE PERCENT: 0.7 %
BUN BLDV-MCNC: 18 MG/DL (ref 7–20)
CALCIUM SERPL-MCNC: 7.8 MG/DL (ref 8.3–10.6)
CHLORIDE BLD-SCNC: 104 MMOL/L (ref 99–110)
CO2: 19 MMOL/L (ref 21–32)
CREAT SERPL-MCNC: 1 MG/DL (ref 0.6–1.2)
EKG ATRIAL RATE: 84 BPM
EKG ATRIAL RATE: 93 BPM
EKG DIAGNOSIS: NORMAL
EKG DIAGNOSIS: NORMAL
EKG P AXIS: 64 DEGREES
EKG P AXIS: 67 DEGREES
EKG P-R INTERVAL: 142 MS
EKG P-R INTERVAL: 154 MS
EKG Q-T INTERVAL: 400 MS
EKG Q-T INTERVAL: 404 MS
EKG QRS DURATION: 80 MS
EKG QRS DURATION: 82 MS
EKG QTC CALCULATION (BAZETT): 472 MS
EKG QTC CALCULATION (BAZETT): 502 MS
EKG R AXIS: -12 DEGREES
EKG R AXIS: -22 DEGREES
EKG T AXIS: 44 DEGREES
EKG T AXIS: 54 DEGREES
EKG VENTRICULAR RATE: 84 BPM
EKG VENTRICULAR RATE: 93 BPM
EOSINOPHILS ABSOLUTE: 0.2 K/UL (ref 0–0.6)
EOSINOPHILS RELATIVE PERCENT: 5.2 %
GFR AFRICAN AMERICAN: >60
GFR NON-AFRICAN AMERICAN: 52
GLUCOSE BLD-MCNC: 87 MG/DL (ref 70–99)
HCT VFR BLD CALC: 27.2 % (ref 36–48)
HEMOGLOBIN: 8.6 G/DL (ref 12–16)
LYMPHOCYTES ABSOLUTE: 0.7 K/UL (ref 1–5.1)
LYMPHOCYTES RELATIVE PERCENT: 18.1 %
MAGNESIUM: 2 MG/DL (ref 1.8–2.4)
MCH RBC QN AUTO: 24.6 PG (ref 26–34)
MCHC RBC AUTO-ENTMCNC: 31.7 G/DL (ref 31–36)
MCV RBC AUTO: 77.8 FL (ref 80–100)
MONOCYTES ABSOLUTE: 0.5 K/UL (ref 0–1.3)
MONOCYTES RELATIVE PERCENT: 12.7 %
NEUTROPHILS ABSOLUTE: 2.4 K/UL (ref 1.7–7.7)
NEUTROPHILS RELATIVE PERCENT: 63.3 %
PDW BLD-RTO: 20.5 % (ref 12.4–15.4)
PLATELET # BLD: 137 K/UL (ref 135–450)
PMV BLD AUTO: 9.2 FL (ref 5–10.5)
POTASSIUM REFLEX MAGNESIUM: 3.1 MMOL/L (ref 3.5–5.1)
RBC # BLD: 3.49 M/UL (ref 4–5.2)
SODIUM BLD-SCNC: 137 MMOL/L (ref 136–145)
TROPONIN: 0.03 NG/ML
WBC # BLD: 3.8 K/UL (ref 4–11)

## 2022-03-10 PROCEDURE — 36415 COLL VENOUS BLD VENIPUNCTURE: CPT

## 2022-03-10 PROCEDURE — 97530 THERAPEUTIC ACTIVITIES: CPT

## 2022-03-10 PROCEDURE — 94761 N-INVAS EAR/PLS OXIMETRY MLT: CPT

## 2022-03-10 PROCEDURE — 93010 ELECTROCARDIOGRAM REPORT: CPT | Performed by: INTERNAL MEDICINE

## 2022-03-10 PROCEDURE — 6370000000 HC RX 637 (ALT 250 FOR IP): Performed by: INTERNAL MEDICINE

## 2022-03-10 PROCEDURE — 80048 BASIC METABOLIC PNL TOTAL CA: CPT

## 2022-03-10 PROCEDURE — 99223 1ST HOSP IP/OBS HIGH 75: CPT | Performed by: INTERNAL MEDICINE

## 2022-03-10 PROCEDURE — 85025 COMPLETE CBC W/AUTO DIFF WBC: CPT

## 2022-03-10 PROCEDURE — 83735 ASSAY OF MAGNESIUM: CPT

## 2022-03-10 PROCEDURE — 6360000002 HC RX W HCPCS: Performed by: INTERNAL MEDICINE

## 2022-03-10 PROCEDURE — 97166 OT EVAL MOD COMPLEX 45 MIN: CPT

## 2022-03-10 PROCEDURE — 97535 SELF CARE MNGMENT TRAINING: CPT

## 2022-03-10 PROCEDURE — 97162 PT EVAL MOD COMPLEX 30 MIN: CPT

## 2022-03-10 PROCEDURE — 1200000000 HC SEMI PRIVATE

## 2022-03-10 RX ORDER — TRAZODONE HYDROCHLORIDE 50 MG/1
50 TABLET ORAL NIGHTLY
Status: DISCONTINUED | OUTPATIENT
Start: 2022-03-10 | End: 2022-03-11 | Stop reason: HOSPADM

## 2022-03-10 RX ORDER — POTASSIUM CHLORIDE 20 MEQ/1
40 TABLET, EXTENDED RELEASE ORAL ONCE
Status: COMPLETED | OUTPATIENT
Start: 2022-03-10 | End: 2022-03-10

## 2022-03-10 RX ORDER — HYDROCORTISONE 25 MG/G
CREAM TOPICAL 4 TIMES DAILY PRN
COMMUNITY

## 2022-03-10 RX ORDER — PANTOPRAZOLE SODIUM 40 MG/1
40 TABLET, DELAYED RELEASE ORAL DAILY
COMMUNITY

## 2022-03-10 RX ADMIN — Medication 1 CAPSULE: at 08:23

## 2022-03-10 RX ADMIN — IRON SUCROSE 200 MG: 20 INJECTION, SOLUTION INTRAVENOUS at 11:37

## 2022-03-10 RX ADMIN — CLONIDINE HYDROCHLORIDE 0.1 MG: 0.1 TABLET ORAL at 21:40

## 2022-03-10 RX ADMIN — AMLODIPINE BESYLATE 10 MG: 10 TABLET ORAL at 08:22

## 2022-03-10 RX ADMIN — ASPIRIN 81 MG: 81 TABLET, COATED ORAL at 08:22

## 2022-03-10 RX ADMIN — HYDRALAZINE HYDROCHLORIDE 25 MG: 25 TABLET, FILM COATED ORAL at 13:56

## 2022-03-10 RX ADMIN — HYDRALAZINE HYDROCHLORIDE 25 MG: 25 TABLET, FILM COATED ORAL at 21:40

## 2022-03-10 RX ADMIN — CLONIDINE HYDROCHLORIDE 0.1 MG: 0.1 TABLET ORAL at 08:22

## 2022-03-10 RX ADMIN — Medication 9 MG: at 21:40

## 2022-03-10 RX ADMIN — HYDRALAZINE HYDROCHLORIDE 25 MG: 25 TABLET, FILM COATED ORAL at 08:22

## 2022-03-10 RX ADMIN — CYANOCOBALAMIN TAB 500 MCG 500 MCG: 500 TAB at 08:22

## 2022-03-10 RX ADMIN — ENOXAPARIN SODIUM 40 MG: 100 INJECTION SUBCUTANEOUS at 08:22

## 2022-03-10 RX ADMIN — POTASSIUM CHLORIDE 40 MEQ: 20 TABLET, EXTENDED RELEASE ORAL at 08:23

## 2022-03-10 ASSESSMENT — ENCOUNTER SYMPTOMS
NAUSEA: 0
SORE THROAT: 0
STRIDOR: 0
EYE REDNESS: 0
VOMITING: 0
ABDOMINAL PAIN: 0
SHORTNESS OF BREATH: 0
RHINORRHEA: 0

## 2022-03-10 ASSESSMENT — PAIN SCALES - GENERAL: PAINLEVEL_OUTOF10: 0

## 2022-03-10 NOTE — PROGRESS NOTES
Patient arrived from the ED to floor at 1800. Vital signs charted. Patient alert and oriented to self. Patient family at bedside. Patient R great toe nail is almost completely off, dark purple in color has a band aid to secure nail. IVF started per MD order. Call light in reach. Daughter will spend the night with the patient.

## 2022-03-10 NOTE — PROGRESS NOTES
BP (!) 162/78   Pulse 86   Temp 97.6 °F (36.4 °C) (Oral)   Resp 15   Wt 156 lb 4.9 oz (70.9 kg)   SpO2 94%   BMI 25.23 kg/m²     PM assessment complete  Lungs cta  Abdomen round bm today  hrrrr    Needed items in reach. Daughter at bedside. Standard safety measures in place.

## 2022-03-10 NOTE — PROGRESS NOTES
Occupational Therapy   Occupational Therapy Initial Assessment  Date: 3/10/2022   Patient Name: Todd Yi  MRN: [id number]     : 1927    Date of Service: 3/10/2022    Discharge Recommendations:  24 hour supervision or assist,Home with [address] scored a 18/24 on the AM-PAC ADL Inpatient form. Current research shows that an AM-PAC score of 18 or greater is typically associated with a discharge to the patient's home setting. Based on the patient's AM-PAC score, and their current ADL deficits, it is recommended that the patient have 2-3 sessions per week of Occupational Therapy at d/c to increase the patient's independence. At this time, this patient demonstrates the endurance and safety to discharge home with initial 24 hour assist and a home OT follow up treatment frequency of 2-3x/wk. Please see assessment section for further patient specific details. If patient discharges prior to next session this note will serve as a discharge summary. Please see below for the latest assessment towards goals. Assessment   Performance deficits / Impairments: Decreased functional mobility ; Decreased ADL status; Decreased strength;Decreased cognition;Decreased safe awareness;Decreased balance;Decreased endurance  Assessment: Pt is a 80 y.o. female admitted with Abnormal EKG, elevated troponin, hyponatremia, anion gap metabolic acidosis. At baseline, pt lives in [place] [address] at [facility], independent ADLs and fxl mobility using 0YF. Family assist with IADLs. Pt currently functioning below baseline d/t the above deficits, today requiring CGA/min A fxl transfers, CGA fxl mobility with RW, CGA toileting, min A LB dressing, and CGA for grooming at sink. Pt easily fatigued and HR up to 149 with standing activity. Will cont to see on acute to address the above limitations and maximize pt's safety and independence.  If family able to provide initial 2-3 days of 24 hour supervision/assist, anticipate pt could return home to [place] [address] with increased assist and home OT. Prognosis: Good  Decision Making: Medium Complexity  OT Education: OT Role;Plan of Care;Transfer Training;ADL Adaptive Strategies;Orientation  Barriers to Learning: cognition, hearing  REQUIRES OT FOLLOW UP: Yes  Activity Tolerance  Activity Tolerance: Patient limited by fatigue  Activity Tolerance: HR up to 149 with activity  Safety Devices  Safety Devices in place: Yes  Type of devices: Call light within reach; Chair alarm in place; Left in chair;Gait belt           Patient Diagnosis(es): The primary encounter diagnosis was Fatigue, unspecified type. Diagnoses of Elevated troponin, Anemia, unspecified type, and Elevated brain natriuretic peptide (BNP) level were also pertinent to this visit. has a past medical history of Arthritis, Hyperlipidemia, and Hypertension. has a past surgical history that includes Cholecystectomy; Inguinal hernia repair (Right, 06/09/2016); and incision and drainage (Left, 6/25/2019). Restrictions  Restrictions/Precautions  Restrictions/Precautions: Fall Risk  Position Activity Restriction  Other position/activity restrictions: IV R FA    Subjective   General  Chart Reviewed: Yes  Patient assessed for rehabilitation services?: Yes  Additional Pertinent Hx: Per Talisha Whitlock MD's H&P: \"Patient is a 66-year-old female with past medical history of hypertension hyperlipidemia who presents to the hospital after noticing several days of severe fatigue. According to the patient's family at the bedside patient has not been keeping up with hydration, patient had EKG performed at nursing home facility which came out abnormal so patient was sent to the emergency department for further evaluation.  \"  Family / Caregiver Present: Yes (son)  Referring Practitioner: Talisha Whitlock MD  Diagnosis: Abnormal EKG, elevated troponin, rule out ACS, fatigue, hyponatremia, anion gap metabolic acidosis likely dehydration, Macrocytic anemia  Subjective  Subjective: Pt met b/s for OT eval/tx. Pt in bed on arrival, agreeable to participate in therapy. Pt denies pain. Social/Functional History  Social/Functional History  Lives With: Alone  [facility]  Home Layout: One level  Home Access: Level entry  Bathroom Shower/Tub: Tub/Shower unit,Shower chair with back  [employer]: Standard (sink and tub nearby)  [employer]: Grab bars in shower,Shower chair,Hand-held shower  Bathroom Accessibility: Accessible  Home Equipment: [address] [place] [address]: Needs assistance (Pt walks to dining room for meals. Family does laundry and cleaning.)  Ambulation Assistance: Independent (with 2DI)  Transfer Assistance: Independent  Active : No  Mode of Transportation: Family  Occupation: Retired  Additional Comments: Son reports pt has had 1 fall ~3 weeks ago       Objective   Vision: Impaired  Vision Exceptions: Wears glasses for reading  Hearing: Exceptions to [facility]  Hearing Exceptions: Hard of hearing/hearing concerns;Bilateral hearing aid      Orientation  Overall Orientation Status: Impaired  Orientation Level: Oriented to person;Oriented to place; Disoriented to situation;Oriented to time     Balance  Sitting Balance: Stand by assistance  Standing Balance: Contact guard assistance  Functional Mobility  Functional - Mobility Device: Rolling Walker  Activity: To/from bathroom  Assist Level: Contact guard assistance  Functional Mobility Comments: Pt completed fxl mobility to/from BR ~10 ft, ~25 ft with RW and CGA. ADL  Grooming: Contact guard assistance;Stand by assistance (standing at sink to wash hands CGA, HR up to 149 with standing.  Sat at sink to brush teeth SBA)  LE Dressing: Minimal assistance (min A to doff briefs, CGA to don briefs.)  Toileting: Contact guard assistance  Additional Comments: Anticipate pt is independent feeding, min/mod A bathing, min A dressing based on ROM/strength, balance, endurance. Bed mobility  Supine to Sit: Supervision (Flat HOB)  Scooting: Stand by assistance     Transfers  Sit to stand: Contact guard assistance;Minimal assistance  Stand to sit: Contact guard assistance   Toilet Transfers  Toilet - Technique: Ambulating  Equipment Used: Grab bars  Toilet Transfer: Minimal assistance    Cognition  Overall Cognitive Status: Exceptions  Arousal/Alertness: Appropriate responses to stimuli  Following Commands: Follows one step commands with increased time; Follows one step commands with repetition ([place])  Attention Span: Attends with cues to redirect  Memory: Decreased short term memory;Decreased recall of recent events  Safety Judgement: Decreased awareness of need for safety;Decreased awareness of need for assistance  Problem Solving: Decreased awareness of errors;Assistance required to identify errors made;Assistance required to generate solutions  Insights: Decreased awareness of deficits  Initiation: Requires cues for some  Sequencing: Requires cues for some     Sensation  Overall Sensation Status: WFL (Denies any N/T)     LUE AROM (degrees)  LUE AROM : WFL  RUE AROM (degrees)  RUE AROM : WFL     LUE Strength  Gross LUE Strength: Exceptions to [facility]  L Shoulder Flex: 3+/5  L Elbow Flex: 4/5  L Hand General: 4/5  RUE Strength  Gross RUE Strength: Exceptions to [facility]  R Shoulder Flex: 3+/5  R Elbow Flex: 4/5  R Hand General: 4/5                   Plan   Plan  Times per week: 3-5  Current Treatment Recommendations: Functional Mobility Training,Balance Training,Endurance Training,Safety Education & Training,Self-Care / ADL,Strengthening,Cognitive/Perceptual Training,Cognitive Reorientation,ROM,Equipment Evaluation, Education, & procurement      AM-PAC Score        AM-[facility] Inpatient Daily Activity Raw Score: 18 (03/10/22 1020)  AM-PAC Inpatient ADL T-Scale Score : 38.66 (03/10/22 1020)  ADL Inpatient CMS 0-100% Score: 46.65 (03/10/22 1020)  ADL Inpatient CMS G-Code Modifier : CK (03/10/22 1020)    Goals  Short term goals  Time Frame for Short term goals: Prior to d/c:  Short term goal 1: Pt will bathe with supervision. Short term goal 2: Pt will dress with supervision. Short term goal 3: Pt will toilet with supervision. Short term goal 4: Pt will complete fxl mobility and fxl transfers to/from ADL surfaces with supervision using AD. Short term goal 5: Pt will increase activity tolerance to achieve the above goals. Long term goals  Time Frame for Long term goals : STGs=LTGs  Patient Goals   Patient goals : to return home.        Therapy Time   Individual Concurrent Group Co-treatment   Time In 9913         Time Out 1018         Minutes 55         Timed Code Treatment Minutes: 555 [facility] Veena OTR/L 7538

## 2022-03-10 NOTE — PROGRESS NOTES
Batteries not working in [employer], remained off, per daughter, to minimize sleep disruptions. Patient up to bathroom at this time. Replaced leads and batteries. Voided in commode. Missed hat. Unable to measure output, and unable to send urine sample. Clean hat placed in commode.

## 2022-03-10 NOTE — PROGRESS NOTES
Hospitalist Progress Note      PCP: Chico Oneil MD    Chief Complaint. Patient is a 79-year-old female with past medical history of hypertension hyperlipidemia who presents to the hospital after noticing several days of severe fatigue. According to the patient's family at the bedside patient has not been keeping up with hydration, patient had EKG performed at nursing home facility which came out abnormal so patient was sent to the emergency department for further evaluation. At time of my evaluation patient does not have any complaints, denies chest pain shortness of breath nausea vomiting diarrhea constipation dysuria, mentions she feels fatigued and exhausted. Date of Admission: 3/9/2022    Subjective:  denies chest pain, nausea, vomiting, shortness of breath, fever or chills.  mention feels overall better    Medications:  Reviewed    Infusion Medications    sodium chloride      sodium chloride 75 mL/hr at 03/09/22 2144     Scheduled Medications    [Held by provider] sertraline  75 mg Oral Daily    [Held by provider] traZODone  50 mg Oral Nightly    sodium chloride flush  10 mL IntraVENous 2 times per day    enoxaparin  40 mg SubCUTAneous Daily    amLODIPine  10 mg Oral Daily    aspirin  81 mg Oral Daily    cloNIDine  0.1 mg Oral BID    hydrALAZINE  25 mg Oral TID    lactobacillus  1 capsule Oral Daily    melatonin  9 mg Oral Nightly    vitamin B-12  500 mcg Oral Daily     PRN Meds: sodium chloride flush, sodium chloride, potassium chloride **OR** potassium alternative oral replacement **OR** potassium chloride, potassium chloride, magnesium sulfate, magnesium hydroxide, acetaminophen **OR** acetaminophen, docusate sodium, loperamide, prochlorperazine    No intake or output data in the 24 hours ending 03/10/22 1840    Physical Exam Performed:    /60   Pulse 87   Temp 95.9 °F (35.5 °C) (Oral)   Resp 18   Wt 149 lb 3.2 oz (67.7 kg)   SpO2 95%   BMI 24.08 kg/m²     General appearance: NAD  HEENT:  Conjunctivae/corneas clear. Neck: Supple, with full range of motion. Respiratory:  Normal respiratory effort. Clear to auscultation, bilaterally without Rales/Wheezes/Rhonchi. Cardiovascular: Regular rate and rhythm with normal S1/S2 without murmurs or rubs  Abdomen: Soft, non-tender, non-distended, normal bowel sounds. Musculoskeletal: No cyanosis or edema bilaterally  Neurologic:  without any focal sensory/motor deficits. grossly non-focal.  Psychiatric: Alert and oriented, Normal mood  Peripheral Pulses: +2 palpable, equal bilaterally       Labs:   Recent Labs     03/09/22  1410 03/10/22  0544   WBC 5.5 3.8*   HGB 11.3* 8.6*   HCT 35.3* 27.2*    137     Recent Labs     03/09/22  1410 03/10/22  0544   * 137   K 3.9 3.1*   CL 98* 104   CO2 17* 19*   BUN 13 18   CREATININE 0.9 1.0   CALCIUM 8.8 7.8*     Recent Labs     03/09/22  1410   AST 43*   ALT 29   BILITOT 0.5   ALKPHOS 116     No results for input(s): INR in the last 72 hours. Recent Labs     03/09/22  1410 03/09/22  1818 03/09/22  2355   TROPONINI 0.03* 0.02* 0.03*       Urinalysis:    No results found for: Kari Ficks, BACTERIA, RBCUA, BLOODU, SPECGRAV, GLUCOSEU    Radiology:  XR CHEST PORTABLE   Final Result   1. No acute abnormality. Assessment/Plan:    Active Hospital Problems    Diagnosis     Fatigue [R53.83]     Primary hypertension [I10]     Elevated troponin [R77.8]        Patient is a 60-year-old female with past medical history of hypertension hyperlipidemia who presents to the hospital after noticing several days of severe fatigue. According to the patient's family at the bedside patient has not been keeping up with hydration, patient had EKG performed at nursing home facility which came out abnormal so patient was sent to the emergency department for further evaluation.   At time of my evaluation patient does not have any complaints, denies chest pain shortness of breath nausea vomiting diarrhea constipation dysuria, mentions she feels fatigued and exhausted.     Assessment  Abnormal EKG, elevated troponin, rule out ACS  fatigue, hyponatremia anion gap metabolic acidosis likely dehydration  Microcytic anemia  Hypertension  Hyperlipidemia     Plan  Monitor on cardiac telemetry, monitor troponin, patient is DNR CCA, cardiology consulted from emergency department, follow-up on recommendations  Monitor replace electrolytes, gentle IV fluid therapy with normal saline  Check iron level, ferritin, iron saturation, A28 level, folic acid  Resume home medications  DVT prophylaxis-Lovenox  Diet: ADULT DIET;  Regular  Code Status: DNR-CCA    PT/OT Eval Status: ordered    Dispo -continue gentle IV fluid therapy monitor replace electrolytes, cardiology ordered echocardiogram, likely plan for discharge tomorrow    Hodan Mitchell MD

## 2022-03-10 NOTE — PROGRESS NOTES
Physical Therapy    Facility/Department: [facility]  Initial Assessment    NAME: Marisel Holland  : 1927  MRN: [id number]    Date of Service: 3/10/2022    Discharge Recommendations:  Continue to assess pending progress,2-3 sessions per [place] hour supervision or assist,Patient would benefit from continued therapy after discharge   PT Equipment Recommendations  Equipment Needed: No    Marisel Holland scored a 18/24 on the AM-PAC short mobility form. Current research shows that an AM-PAC score of 18 or greater is typically associated with a discharge to the patient's home setting. Based on the patient's AM-PAC score and their current functional mobility deficits, it is recommended that the patient have 2-3 sessions per week of Physical Therapy at d/c to increase the patient's independence. At this time, this patient demonstrates the endurance and safety to discharge home with home services and a follow up treatment frequency of 2-3x/wk. Please see assessment section for further patient specific details. If patient discharges prior to next session this note will serve as a discharge summary. Please see below for the latest assessment towards goals. Assessment   Body structures, Functions, Activity limitations: Decreased functional mobility ; Decreased endurance;Decreased balance  Assessment: Patient is a 68-year-old female with a PMHx of hypertension hyperlipidemia. Pt presented to [facility] due to several days of severe fatigue. Pt found to have elevated troponin. Pt lives in independent living at [facility] and is ind with functional mobility and ADLs using RW. Today,3/10, pt presenting below her baseline function. Pt able to complete bed mobility with Supervision, transfers with CGA-Kathy, and amb with RW and CGA.  Pt with elevated HR up to 149 with dynamic standing activities thats decreased to ~110 with seated rest. AT this time, anticipate pt will be safe to return home with 24 hr supervision and home PT once she is medically stable. Will continue to assess. Treatment Diagnosis: Elevated troponin, generalized fatigue  Prognosis: Fair  Decision Making: Medium Complexity  History: See below  Exam: See below  Clinical Presentation: Evolving  PT Education: Goals; General Safety;PT Role;Orientation;Transfer Training;Functional Mobility Training;Plan of Care  Barriers to Learning: Teller  REQUIRES PT FOLLOW UP: Yes  Activity Tolerance  Activity Tolerance: Patient Tolerated treatment well; Other  Activity Tolerance: Limited to high HR with activity       Patient Diagnosis(es): The primary encounter diagnosis was Fatigue, unspecified type. Diagnoses of Elevated troponin, Anemia, unspecified type, and Elevated brain natriuretic peptide (BNP) level were also pertinent to this visit. has a past medical history of Arthritis, Hyperlipidemia, and Hypertension. has a past surgical history that includes Cholecystectomy; Inguinal hernia repair (Right, 06/09/2016); and incision and drainage (Left, 6/25/2019). Restrictions  Restrictions/Precautions  Restrictions/Precautions: Fall Risk  Position Activity Restriction  Other position/activity restrictions: IV R FA     Vision/Hearing  Vision: Impaired  Vision Exceptions: Wears glasses for reading  Hearing: Exceptions to [facility]  Hearing Exceptions: Hard of hearing/hearing concerns;Bilateral hearing aid       Subjective  General  Chart Reviewed: Yes  Patient assessed for rehabilitation services?: Yes  Additional Pertinent Hx: Per H&P \"Patient is a 51-year-old female with past medical history of hypertension hyperlipidemia who presents to the hospital after noticing several days of severe fatigue. According to the patient's family at the bedside patient has not been keeping up with hydration, patient had EKG performed at nursing home facility which came out abnormal so patient was sent to the emergency department for further evaluation.   At time of my evaluation patient does not have any complaints, denies chest pain shortness of breath nausea vomiting diarrhea constipation dysuria, mentions she feels fatigued and exhausted. \"  Response To Previous Treatment: Not applicable  Referring Practitioner: Rajiv Becerril MD  Referral Date : 03/09/22  Diagnosis: Elevated troponin, increased fatigue  Follows Commands: Within Functional Limits  Other (Comment): Pt amb to commode and able to transfers to/from commode with CGA-Kathy, sat on commode with SBA. Able to wipe and manage briefs with CGA. stood to sink to wash her hands and brush her teeth. HR increased as high as 149 in standing and was cued to take a seated rest break. Pt's HR returned to 110-120's when sitting. Pt brushed her hair and finished her teeth in sitting. General Comment  Comments: Very [place]  Subjective  Subjective: Pt sitting in bed finishing her breakfast upon arrival. Pt very pleasant and agreeable to therapy evaluation. Denies any pain. Pain Screening  Patient Currently in Pain: Denies          Orientation  Orientation  Orientation Level: Oriented to time;Oriented to person;Disoriented to situation;Disoriented to place (Knew in hospital, unsure which one)     Social/Functional History  Social/Functional History  Lives With: Alone  Type of Home:  ([facility])  Home Layout: One level  Home Access: Level entry  Bathroom Shower/Tub: Tub/Shower unit,Shower chair with back  [employer]: Standard (sink and tub nearby)  [employer]: Grab bars in shower,Shower chair,Hand-held shower  Bathroom Accessibility: Accessible  Home Equipment: [address]  ADL Assistance: [address]: Needs assistance (Pt walks to dining room for meals.  Family does laundry and cleaning.)  Ambulation Assistance: Independent (with 4VX)  Transfer Assistance: Independent  Active : No  Mode of Transportation: Family  Occupation: Retired  Additional Comments: Son reports pt has had 1 fall ~3 weeks ago    Objective  AROM RLE (degrees)  RLE AROM: WFL  AROM LLE (degrees)  LLE AROM : WFL  Strength RLE  Strength RLE: WFL  Comment: Grossly 4/5 for hip flexion, knee extnesion, ankle DF/PF  Strength LLE  Strength LLE: WFL  Comment: Grossly 4/5 for hip flexion, knee extnesion, ankle DF/PF  Tone RLE  RLE Tone: Normotonic  Tone LLE  LLE Tone: Normotonic  Motor Control  Gross Motor?: WFL  Sensation  Overall Sensation Status: WFL (Denies any N/T)  Bed mobility  Supine to Sit: Supervision (Flat HOB)  Scooting: Stand by assistance  Transfers  Sit to Stand: Contact guard assistance;Minimal Assistance (from commode and EOB to RW)  Stand to sit: Contact guard assistance;Minimal Assistance  Ambulation  Ambulation?: Yes  Ambulation 1  Surface: level tile  Device: Rolling Walker  Assistance: Contact guard assistance  Quality of Gait: Short small steps, increased GAURAV, slow maya  Gait Deviations: Slow Maya; Increased GAURAV; Decreased step length;Decreased step height  Distance: 10', 25'  Comments:  after 25' amb  Stairs/Curb  Stairs?: No     Balance  Posture: Fair  Sitting - Static: Good  Sitting - Dynamic: Good;-  Standing - Static: Fair;+ (CGA at )  Standing - Dynamic: Fair;+ (CGA at [address] [place] [address])        Plan   Plan  Times per week: 3-5x  Current Treatment Recommendations: Strengthening,Balance Training,Endurance Training,Functional Mobility Training,Transfer [employer] Education & Training,Patient/Caregiver Education & Training  Safety Devices  Type of devices:  All fall risk precautions in place,Left in chair,Call light within reach,Chair alarm in place,Gait belt,Patient at risk for falls,Nurse notified    AM-PAC Score  AM-PAC Inpatient Mobility Raw Score : 18 (03/10/22 1020)  AM-PAC Inpatient T-Scale Score : 43.63 (03/10/22 1020)  Mobility Inpatient CMS 0-100% Score: 46.58 (03/10/22 1020)  Mobility Inpatient CMS G-Code Modifier : CK (03/10/22 1020)          Goals  Short term goals  Time Frame for Short term goals: While in acute care  Short term goal 1: Transfers with supervision  Short term goal 2: bed mobility Sly  Short term goal 3: Amb [place]  1560' with RW and supervision  Long term goals  Time Frame for Long term goals : STG=LTG  Patient Goals   Patient goals :  To return home       Therapy Time   Individual Concurrent Group Co-treatment   Time In 923         Time Out 1018         Minutes 55         Timed Code Treatment Minutes: 55 Minutes     Electronically signed by Bradley Beaver on 3/10/2022 at 10:21 AM

## 2022-03-10 NOTE — PROGRESS NOTES
4 Eyes Skin Assessment     NAME:  Martha Mckenzie  YOB: 1927  MEDICAL RECORD NUMBER:  [id number]    The patient is being assess for  Admission    I agree that 2 RN's have performed a thorough Head to Toe Skin Assessment on the patient. ALL assessment sites listed below have been assessed. Areas assessed by both nurses:    Head, Face, Ears, Shoulders, Back, Chest, Arms, Elbows, Hands, Sacrum. Buttock, Coccyx, Ischium and Legs. Feet and Heels        Does the Patient have a Wound?  No noted wound(s)       Song Prevention initiated:  No   Wound Care Orders initiated:  No    Pressure Injury (Stage 3,4, Unstageable, DTI, NWPT, and Complex wounds) if present place consult order under [de-identified] No    New and Established Ostomies if present place consult order under : No      Nurse 1 eSignature: Electronically signed by Jalyn Lawrence RN on 3/9/22 at 7:30 PM EST    **SHARE this note so that the co-signing nurse is able to place an eSignature**    Nurse 2 eSignature: Electronically signed by Jalyn Lawrence RN on 3/9/22 at 7:30 PM EST

## 2022-03-10 NOTE — PROGRESS NOTES
Daughter questioning home medications.  At this time zoloft and trazodone is on hold until Qtc normal.

## 2022-03-10 NOTE — CONSULTS
[address] [facility]                                  CONSULTATION    PATIENT NAME: Britney Lee                        :        1927  MED REC NO:   [id number]                          ROOM:       2729  ACCOUNT NO:   [de-identified]                           ADMIT DATE: 2022  PROVIDER:     Devon English MD    CARDIOLOGY CONSULTATION    CONSULT DATE:  03/10/2022    HISTORY OF PRESENT ILLNESS:  This is a pleasant 77-year-old female who  is very hard of hearing with a history of hypertension, hyperlipidemia,  who came to the hospital from the nursing home with symptoms of  increasing fatigue which she has noticed for the last few days. She has  also not been eating properly. She has had off and on diarrhea in the  last week or so. According to her son, they also noticed a dark stool. EKG was obtained which raised concern of abnormality and she was sent to  the hospital.  She has been admitted for further evaluation. She had  serial troponins obtained which were also marginally abnormal.  She  denied any chest tightness, pressure, heaviness, shortness of breath,  orthopnea, or PND to me. REVIEW OF SYSTEMS:  Please see HPI. All other systems are reviewed and  they are negative. PAST MEDICAL HISTORY:  1. History of hypertension. 2.  Hyperlipidemia. 3.  History of arthritis. PAST SURGICAL HISTORY:  She had a cholecystectomy, inguinal hernia  repair. SOCIAL HISTORY:  She lives in a nursing home. No history of smoking or  alcohol abuse. FAMILY HISTORY:  Negative for any early or premature atherosclerosis. MEDICINES:  Have been reviewed. ALLERGIES:  Have been reviewed. PHYSICAL EXAMINATION:  VITAL SIGNS:  Her pulse is 83 and regular, blood pressure 164/84,  respirations are 18, oxygen saturation 94%. CONSTITUTIONAL:  She is alert and oriented. HEENT:  Neck is supple.   I am unable to appreciate any jugular venous  distention or carotid bruits. Eyes show pale conjunctivae. No icterus  noted. CARDIAC:  Normal S1 and S2. There is a 2/6 systolic murmur heard at the  base of the heart. A2 component of the second heart sound is present. LUNGS:  Few basilar crackles. ABDOMEN:  Soft, nontender. Bowel sounds are present. EXTREMITIES:  No edema. NEUROLOGICAL:  Alert, oriented. Cranial nerves II through XII intact. No focal deficits. SKIN:  Areas of bruising. LABORATORY DATA:  Sodium is 137, potassium is 3.1, chloride 104, bicarb  19, BUN is 18, creatinine 1. Her troponins were 0.02 to 0.03. ProBNP  707. Liver functions were normal.  Hemoglobin initially was 11.3, the  repeat hemoglobin this morning is 8.6; hematocrit is 27.2, MCV is 77. Chest x-ray showed no acute abnormalities. 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 IMPRESSION:  1. This is a 28-year-old female who has presented with some weakness  which probably is combination of volume depletion and her anemia. I do  not think this is cardiac in origin. 2.  Borderline troponin elevation. This can be due to a type 2 MI. Certainly, at her age, underlying ischemic heart disease can also  contribute to this though she has no current symptoms to suggest acute  coronary syndrome. 3.  Hypertension. Blood pressure is mildly elevated. 4.  Anemia. The patient has drop in the hemoglobin, partly dilutional,  but with her dark stools at home, underlying GI bleed will have to be  excluded. 5.  Question of QT prolongation. Her EKG and monitor strip does not  seem to show any significant QT prolongation in my assessment. 6.  Systolic murmur, suggestive of aortic valve disease. RECOMMENDATIONS:  1. We will obtain an echocardiogram to assess her aortic valve disease  and LV function. 2.  Guaiac her stools and further workup for her anemia if appropriate.    We will defer it to primary team.  3.  If her echocardiogram shows no significant valvular disease and  preserved LV function, no further workup will be needed. At her age, I  will not pursue any noninvasive workup either at this time to look for  underlying ischemic heart disease. Complexity of medical decision making is high.         Bindu Santiago MD    D: 03/10/2022 11:45:30       T: 03/10/2022 13:08:21     [email or web address]  Job#: [id number]     Doc#: [id number]    CC:

## 2022-03-11 VITALS
WEIGHT: 149 LBS | BODY MASS INDEX: 24.05 KG/M2 | SYSTOLIC BLOOD PRESSURE: 181 MMHG | OXYGEN SATURATION: 95 % | RESPIRATION RATE: 20 BRPM | HEART RATE: 93 BPM | DIASTOLIC BLOOD PRESSURE: 92 MMHG | TEMPERATURE: 98.3 F

## 2022-03-11 LAB
ANION GAP SERPL CALCULATED.3IONS-SCNC: 15 MMOL/L (ref 3–16)
BUN BLDV-MCNC: 14 MG/DL (ref 7–20)
CALCIUM SERPL-MCNC: 7.9 MG/DL (ref 8.3–10.6)
CHLORIDE BLD-SCNC: 106 MMOL/L (ref 99–110)
CO2: 17 MMOL/L (ref 21–32)
CREAT SERPL-MCNC: 0.9 MG/DL (ref 0.6–1.2)
GFR AFRICAN AMERICAN: >60
GFR NON-AFRICAN AMERICAN: 58
GLUCOSE BLD-MCNC: 98 MG/DL (ref 70–99)
MAGNESIUM: 1.9 MG/DL (ref 1.8–2.4)
POTASSIUM REFLEX MAGNESIUM: 3.3 MMOL/L (ref 3.5–5.1)
SODIUM BLD-SCNC: 138 MMOL/L (ref 136–145)

## 2022-03-11 PROCEDURE — 94760 N-INVAS EAR/PLS OXIMETRY 1: CPT

## 2022-03-11 PROCEDURE — 83735 ASSAY OF MAGNESIUM: CPT

## 2022-03-11 PROCEDURE — 6360000002 HC RX W HCPCS: Performed by: INTERNAL MEDICINE

## 2022-03-11 PROCEDURE — 97530 THERAPEUTIC ACTIVITIES: CPT

## 2022-03-11 PROCEDURE — 36415 COLL VENOUS BLD VENIPUNCTURE: CPT

## 2022-03-11 PROCEDURE — 97116 GAIT TRAINING THERAPY: CPT

## 2022-03-11 PROCEDURE — 80048 BASIC METABOLIC PNL TOTAL CA: CPT

## 2022-03-11 PROCEDURE — 6370000000 HC RX 637 (ALT 250 FOR IP): Performed by: INTERNAL MEDICINE

## 2022-03-11 RX ADMIN — ASPIRIN 81 MG: 81 TABLET, COATED ORAL at 08:06

## 2022-03-11 RX ADMIN — CYANOCOBALAMIN TAB 500 MCG 500 MCG: 500 TAB at 08:06

## 2022-03-11 RX ADMIN — Medication 1 CAPSULE: at 08:06

## 2022-03-11 RX ADMIN — ENOXAPARIN SODIUM 40 MG: 100 INJECTION SUBCUTANEOUS at 08:06

## 2022-03-11 RX ADMIN — HYDRALAZINE HYDROCHLORIDE 25 MG: 25 TABLET, FILM COATED ORAL at 08:06

## 2022-03-11 RX ADMIN — AMLODIPINE BESYLATE 10 MG: 10 TABLET ORAL at 08:06

## 2022-03-11 RX ADMIN — ACETAMINOPHEN 650 MG: 325 TABLET ORAL at 03:41

## 2022-03-11 RX ADMIN — CLONIDINE HYDROCHLORIDE 0.1 MG: 0.1 TABLET ORAL at 08:06

## 2022-03-11 ASSESSMENT — PAIN SCALES - GENERAL
PAINLEVEL_OUTOF10: 3
PAINLEVEL_OUTOF10: 0

## 2022-03-11 NOTE — PLAN OF CARE
Problem: Falls - Risk of:  Goal: Will remain free from falls  Description: Will remain free from falls  3/11/2022 1024 by Miki Lopez RN  Outcome: Ongoing     Problem: Falls - Risk of:  Goal: Absence of physical injury  Description: Absence of physical injury  Outcome: Ongoing     Problem: Skin Integrity:  Goal: Will show no infection signs and symptoms  Description: Will show no infection signs and symptoms  3/11/2022 1024 by Erin Stover RN  Outcome: Ongoing     Problem: Skin Integrity:  Goal: Absence of new skin breakdown  Description: Absence of new skin breakdown  Outcome: Ongoing

## 2022-03-11 NOTE — PROGRESS NOTES
Physical Therapy  Facility/Department: [address] MED SURG  Daily Treatment Note  NAME: Cristobal Ayoub  : 1927  MRN: [id number]    Date of Service: 3/11/2022    Discharge Recommendations:  Continue to assess pending progress,2-3 sessions per [place] hour supervision or assist,Patient would benefit from continued therapy after discharge   PT Equipment Recommendations  Equipment Needed: No    Cristobal Ayoub scored a 18/24 on the AM-PAC short mobility form. Current research shows that an AM-PAC score of 18 or greater is typically associated with a discharge to the patient's home setting. Based on the patient's AM-PAC score and their current functional mobility deficits, it is recommended that the patient have 2-3 sessions per week of Physical Therapy at d/c to increase the patient's independence. At this time, this patient demonstrates the endurance and safety to discharge home with home services and a follow up treatment frequency of 2-3x/wk. Please see assessment section for further patient specific details. If patient discharges prior to next session this note will serve as a discharge summary. Please see below for the latest assessment towards goals. Assessment   Body structures, Functions, Activity limitations: Decreased functional mobility ; Decreased endurance;Decreased balance  Assessment: Patient is a 80-year-old female with a PMHx of hypertension hyperlipidemia. Pt presented to [facility] due to several days of severe fatigue. Pt found to have elevated troponin. Pt lives in independent living at [facility] and is ind with functional mobility and ADLs using RW. Today,3/11, pt continues to present below her baseline function. Pt able to complete bed mobility with CGA-Kathy, transfers with CGA, and amb short distances in her room with RW and CGA and one instance of Kathy. Pt with HR ranging from  BPM throughout session. Standing balance at sink with UE support on sink for ~ 3 minutes with CGA.  Pt is limited by fatigue and generalized weakness. At this time, anticipate pt will be safe to return home with 24 hr supervision and home PT once she is medically stable. Will continue to assess. Treatment Diagnosis: Elevated troponin, generalized fatigue  Prognosis: Fair  Decision Making: Medium Complexity  PT Education: Goals; General Safety;PT Role;Orientation;Transfer Training;Functional Mobility Training;Plan of Care  Barriers to Learning: [place]  REQUIRES PT FOLLOW UP: Yes  Activity Tolerance  Activity Tolerance: Patient Tolerated treatment well  Activity Tolerance: HR varied from 99- 118 BPM throughout session. Patient Diagnosis(es): The primary encounter diagnosis was Fatigue, unspecified type. Diagnoses of Elevated troponin, Anemia, unspecified type, and Elevated brain natriuretic peptide (BNP) level were also pertinent to this visit. has a past medical history of Arthritis, Hyperlipidemia, and Hypertension. has a past surgical history that includes Cholecystectomy; Inguinal hernia repair (Right, 06/09/2016); and incision and drainage (Left, 6/25/2019). Restrictions  Restrictions/Precautions  Restrictions/Precautions: Fall Risk  Position Activity Restriction  Other position/activity restrictions: IV R FA     Subjective   General  Chart Reviewed: Yes  Additional Pertinent Hx: Per H&P \"Patient is a 30-year-old female with past medical history of hypertension hyperlipidemia who presents to the hospital after noticing several days of severe fatigue. According to the patient's family at the bedside patient has not been keeping up with hydration, patient had EKG performed at nursing home facility which came out abnormal so patient was sent to the emergency department for further evaluation. At time of my evaluation patient does not have any complaints, denies chest pain shortness of breath nausea vomiting diarrhea constipation dysuria, mentions she feels fatigued and exhausted. \"  Response To Previous Treatment: Not applicable  Referring Practitioner: Oanh May MD  Subjective  Subjective: Pt sitting in bed finishing her breakfast upon arrival. Pt very pleasant and agreeable to therapy evaluation. Denies any pain. General Comment  Comments: Very [place]     Objective   Bed mobility  Supine to Sit: Contact guard assistance;Minimal assistance (Flat HOB)  Scooting: Supervision  Transfers  Sit to Stand: Contact guard assistance  Stand to sit: Contact guard assistance  Ambulation  Ambulation?: Yes  More Ambulation?: No  Ambulation 1  Surface: level tile  Device: Rolling Walker  Assistance: Contact guard assistance;Minimal assistance (One instance of small LOB while turning to commode)  Quality of Gait: Short small steps, increased GAURAV, slow lis  Gait Deviations: Slow Lis; Increased GAURAV; Decreased step length;Decreased step height  Distance: 25', 30', 30'  Comments:  after 25' amb  Stairs/Curb  Stairs?: No                  Comment: Pt able to manage donning/doffing brief an wiping when using the commode. Supervision for sitting balance while having 1 small loose BM and urinating. Pt stood to sink for ~ 3 minutes to valeri her hands and brush her teeth with CGA. AM-PAC Score  AM-PAC Inpatient Mobility Raw Score : 18 (03/11/22 0953)  AM-PAC Inpatient T-Scale Score : 43.63 (03/11/22 0953)  Mobility Inpatient CMS 0-100% Score: 46.58 (03/11/22 0953)  Mobility Inpatient CMS G-Code Modifier : CK (03/11/22 0053)          Goals  Short term goals  Time Frame for Short term goals: While in acute care  Short term goal 1: Transfers with supervision  Short term goal 2: bed mobility Sly  Short term goal 3: Amb 80' with RW and supervision  Long term goals  Time Frame for Long term goals : STG=LTG  Patient Goals   Patient goals :  To return home    Plan    Plan  Times per week: 3-5x  Current Treatment Recommendations: Dewitte Gey Training,Functional Mobility Training,Transfer Training,Gait Training,Safety Education & Training,Patient/Caregiver Education & Training  Safety Devices  Type of devices:  All fall risk precautions in place,Left in chair,Call light within reach,Chair alarm in place,Gait belt,Patient at risk for falls,Nurse notified     Therapy Time   Individual Concurrent Group Co-treatment   Time In 0905         Time Out 0945         Minutes 40               Electronically signed by Bradley Yin on 3/11/2022 at 9:54 AM

## 2022-03-11 NOTE — PLAN OF CARE
Problem: Falls - Risk of:  Goal: Will remain free from falls  Description: Will remain free from falls  3/11/2022 0426 by Jeanette Mendez RN  Outcome: Ongoing     Problem: Skin Integrity:  Goal: Will show no infection signs and symptoms  Description: Will show no infection signs and symptoms  3/11/2022 0426 by Jeanette Mendez RN  Outcome: Ongoing

## 2022-03-11 NOTE — DISCHARGE INSTR - COC
Continuity of Care Form    Patient Name: Jluis Garcia   :  1927  MRN:  [id number]    Admit date:  3/9/2022  Discharge date:  3/11/22    Code Status Order: DNR-CCA   Advance Directives:      Admitting Physician:  Ciara Romero MD  PCP: Francine Laurent MD    Discharging Nurse: [facility] Unit/Room#: D6L-3769/3945-97  Discharging Unit Phone Number: [phone]    Emergency Contact:   Extended Emergency Contact Information  Primary Emergency Contact: Jeane Kline  Address: [place]           [id number]  Home Phone: [phone]  Relation: Child  Secondary Emergency Contact: [address] [place], [place] [address] St Phone: [phone]  Relation: Child    Past Surgical History:  Past Surgical History:   Procedure Laterality Date    CHOLECYSTECTOMY      INCISION AND DRAINAGE Left 2019    INCISION AND DRAINAGE LEFT KNEE performed by Miguelangel Barrow MD at [address] Right 2016    incarcerated spigelian IHR with mesh       Immunization History:   Immunization History   Administered Date(s) Administered    COVID-19, Pfizer Purple top, DILUTE for use, 12+ yrs, 30mcg/0.3mL dose 2021, 2021    Tdap (Boostrix, Adacel) 2019, 2020       Active Problems:  Patient Active Problem List   Diagnosis Code    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    Knee laceration, left, initial encounter S81.012A    Knee laceration, right, sequela S81.011S    Elevated troponin R77.8    Fatigue R53.83    Primary hypertension I10       Isolation/Infection:   Isolation            No Isolation          Patient Infection Status       None to display            Nurse Assessment:  Last Vital Signs: BP (!) 181/92   Pulse 93   Temp 98.3 °F (36.8 °C) (Oral)   Resp 20   Wt 149 lb (67.6 kg)   SpO2 95%   BMI 24.05 kg/m²     Last documented pain score (0-10 scale): Pain Level: 0  Last Weight:   Wt Readings from Last 1 Encounters:   22 149 lb (67.6 kg)     Mental Status:  oriented, alert    IV Access:  - None    Nursing Mobility/ADLs:  Walking   Assisted  Transfer  Assisted  Bathing  Assisted  Dressing  Assisted  Toileting  Assisted  Feeding  Independent  Med [address]  Assisted  Med Delivery   whole    Wound Care Documentation and Therapy:        Elimination:  Continence: Bowel: Yes  Bladder: Yes  Urinary Catheter: None   Colostomy/Ileostomy/Ileal Conduit: No       Date of Last BM: 3/11/22  No intake or output data in the 24 hours ending 22 1314  No intake/output data recorded. Safety Concerns: At Risk for Falls    Impairments/Disabilities:      Vision and Hearing    Nutrition Therapy:  Current Nutrition Therapy:   - Oral Diet:  General    Routes of Feeding: Oral  Liquids: Thin Liquids  Daily Fluid Restriction: no  Last Modified Barium Swallow with Video (Video Swallowing Test): not done    Treatments at the Time of Hospital Discharge:   Respiratory Treatments: None  Oxygen Therapy:  is not on home oxygen therapy.   Ventilator:    - No ventilator support    Rehab Therapies: None  Weight Bearing Status/Restrictions: [address]  Weight Bearin}  Other Medical Equipment (for information only, NOT a DME order):  {EQUIPMENT:[id number]}  Other Treatments: ***    Patient's personal belongings (please select all that are sent with patient):  {CHP DME Belongings:[id number]}    RN SIGNATURE:  {Esignature:[id number]}    CASE MANAGEMENT/SOCIAL WORK SECTION    Inpatient Status Date: ***    Readmission Risk Assessment Score:  Readmission Risk              Risk of Unplanned Readmission:  18           Discharging to Facility/ Agency   Name:   Address:  Phone:  Fax:    Dialysis Facility (if applicable)   Name:  Address:  Dialysis Schedule:  Phone:  Fax:    / signature: {Esignature:[id number]}    PHYSICIAN SECTION    Prognosis: Good    Condition at Discharge: Stable    Rehab Potential (if transferring to Rehab): Good    Recommended Labs or Other Treatments After Discharge: follow up with your pcp in 1 week    Physician Certification: I certify the above information and transfer of Prashanth Kind  is necessary for the continuing treatment of the diagnosis listed and that she requires Yovany Byrd for greater 30 days.      Update Admission H&P: No change in H&P    PHYSICIAN SIGNATURE:  Electronically signed by Eufemia Booth MD on 3/11/22 at 1:14 PM EST

## 2022-03-11 NOTE — PROGRESS NOTES
[place] patient groaning. Found patient awake in bed. Reports back pain and inability to sleep. Took her to the bathroom. Gave her water and tylenol. Positioned for comfort.

## 2022-03-11 NOTE — PROGRESS NOTES
Patient discharged back to [facility]. Patient and family given discharge instructions. Patient IV removed intact. Patient and all belongings taken to the lobby and Son transported her home.

## 2022-03-11 NOTE — CARE COORDINATION
CASE MANAGEMENT DISCHARGE SUMMARY: No D/C needs    DISCHARGE DATE: 3/11/22    DISCHARGED TO [place] [id number] in Assisted Living(Family is increasing services)     TRANSPORTATION: Family to transport             TIME: [id number]       Electronically signed by LLOYD Crawford on 3/11/2022 at 2:09 PM

## 2022-03-11 NOTE — PROGRESS NOTES
Patient alert and oriented. Denies pain. Patient had family at bedside the entire day. Patient ambulates to bathroom with CGA and walker. Patient family will not be spending the night with patient. Patient to have Echo tomorrow. Call light in reach will continue to monitor.

## 2022-03-21 NOTE — PROGRESS NOTES
Physician Progress Note      PATIENT:               Eva Armstrong  CSN #:                  [id number]  :                       1927  ADMIT DATE:       3/9/2022 1:52 PM  [address] [place] DATE:        3/11/2022 2:33 PM  RESPONDING  PROVIDER #:        Merlin Hugo MD          QUERY TEXT:    Pt admitted with dehydration, acidosis, fatigue and hyponatremia . Noted   documentation of  on type 2 MI on 3/10/22 by ordered Cardiology consultant. If possible, please document in progress notes and discharge summary:    The medical record reflects the following:  Risk Factors: 80years old HTN Anemia dehydration   Systolic murmur,   suggestive of aortic valve disease  Clinical Indicators: Troponin  0.03---0.02--0.03 per Cardiology consult    \"Borderline troponin elevation. This can be due to a type 2 MI. Certainly, at   her age, underlying ischemic heart disease can also contribute to this though   she has no current symptoms to suggest acute coronary syndrome. \"  Treatment: Cardiology consult , serial troponin and EKGs ,Norvasc , Catapres,   and Hydralazine  Options provided:  -- Type 2 MI confirmed present on admission  -- Type 2 MI ruled out  -- Other - I will add my own diagnosis  -- Disagree - Not applicable / Not valid  -- Disagree - Clinically unable to determine / Unknown  -- Refer to Clinical Documentation Reviewer    PROVIDER RESPONSE TEXT:    The diagnosis of Type 2 MI was confirmed as present on admission.     Query created by: Luis Gallegos on 3/17/2022 2:07 PM      Electronically signed by:  Merlin Hugo MD 3/21/2022 11:20 AM

## 2022-04-08 PROBLEM — R77.8 ELEVATED TROPONIN: Status: RESOLVED | Noted: 2022-03-09 | Resolved: 2022-04-08

## 2022-04-18 NOTE — DISCHARGE SUMMARY
Hospital Medicine Discharge Summary    Patient ID: Manas Pereira      Patient's PCP: Carmen Hayward MD    Admit Date: 3/9/2022     Discharge Date: 3/11/2022    Admitting Physician: Shailesh Reynolds MD     Discharge Physician: Shailesh Reynolds MD     Discharge Diagnoses: Active Hospital Problems    Diagnosis Date Noted    Fatigue [R53.83]     Primary hypertension [I10]        The patient was seen and examined on day of discharge and this discharge summary is in conjunction with any daily progress note from day of discharge. Condition at discharge - stable    Hospital Course: patient seen and evaluated on the day of discharge. Patient informed about following up with appointments. Patient verbalized understanding for follow-up appointments. The patient and / or the family were informed of the results of tests, a time was given to answer questions, a plan was proposed and they agreed with plan. Medical reconciliation performed. Patient discharged stable condition.       Patient is a 80-year-old female with past medical history of hypertension hyperlipidemia who presents to the hospital after noticing several days of severe fatigue.  According to the patient's family at the bedside patient has not been keeping up with hydration, patient had EKG performed at nursing home facility which came out abnormal so patient was sent to the emergency department for further evaluation.  At time of my evaluation patient does not have any complaints, denies chest pain shortness of breath nausea vomiting diarrhea constipation dysuria, mentions she feels fatigued and exhausted.     Assessment  Abnormal EKG, elevated troponin, rule out ACS  fatigue, hyponatremia anion gap metabolic acidosis likely dehydration  Microcytic anemia  Hypertension  Hyperlipidemia     Plan  Monitor on cardiac telemetry, monitor troponin, patient is DNR CCA, cardiology consulted from emergency department, follow-up on recommendations  Monitor replace electrolytes, gentle IV fluid therapy with normal saline  Check iron level, ferritin, iron saturation, D99 level, folic acid  Resume home medications  DVT prophylaxis-Lovenox  Diet: ADULT DIET; Regular    Seen by cardiology, stable for discharge, Patient family wishes patient to be discharged      Exam:     BP (!) 181/92   Pulse 93   Temp 98.3 °F (36.8 °C) (Oral)   Resp 20   Wt 149 lb (67.6 kg)   SpO2 95%   BMI 24.05 kg/m²     General appearance: NAD  HEENT:  Conjunctivae/corneas clear. Neck: Supple, with full range of motion. Respiratory:  Normal respiratory effort. Clear to auscultation, bilaterally without Rales/Wheezes/Rhonchi. Cardiovascular: Regular rate and rhythm with normal S1/S2 without murmurs or rubs  Abdomen: Soft, non-tender, non-distended, normal bowel sounds. Musculoskeletal: No cyanosis or edema bilaterally  Neurologic:  without any focal sensory/motor deficits. grossly non-focal.  Psychiatric: Alert and oriented, Normal mood  Peripheral Pulses: +2 palpable, equal bilaterally     Consults:     IP CONSULT TO CARDIOLOGY  IP CONSULT TO SOCIAL WORK      Code Status:  Prior    Activity: activity as tolerated    Labs: For convenience and continuity at follow-up the following most recent labs are provided:      CBC:    Lab Results   Component Value Date    WBC 3.8 03/10/2022    HGB 8.6 03/10/2022    HCT 27.2 03/10/2022     03/10/2022       Renal:    Lab Results   Component Value Date     03/11/2022    K 3.3 03/11/2022     03/11/2022    CO2 17 03/11/2022    BUN 14 03/11/2022    CREATININE 0.9 03/11/2022    CALCIUM 7.9 03/11/2022       Discharge Medications:     Discharge Medication List as of 3/11/2022  1:52 PM           Details   pantoprazole (PROTONIX) 40 MG tablet Take 40 mg by mouth dailyHistorical Med      hydrocortisone (PROCTOZONE-HC) 2.5 % CREA rectal cream Place rectally 4 times daily as needed for Hemorrhoids Apply topically. , Rectal, 4 TIMES DAILY PRN, Historical Med      witch hazel-glycerin (TUCKS) pad Place rectally as needed for Hemorrhoids Place rectally as needed., Rectal, PRN, Historical Med      amLODIPine (NORVASC) 10 MG tablet Take 10 mg by mouth dailyHistorical Med      aspirin 81 MG EC tablet Take 81 mg by mouth dailyHistorical Med      cloNIDine (CATAPRES) 0.1 MG tablet Take 0.1 mg by mouth 2 times dailyHistorical Med      lactobacillus (CULTURELLE) capsule Take 1 capsule by mouth dailyHistorical Med      melatonin 5 MG TABS tablet Take 10 mg by mouth nightlyHistorical Med      traZODone (DESYREL) 50 MG tablet Take 50 mg by mouth nightly Take 1/2 tablet. May increase to full tablet if needed to promote sleep. Historical Med      docusate sodium (COLACE) 100 MG capsule Take 100 mg by mouth daily as needed for ConstipationHistorical Med      loperamide (IMODIUM) 2 MG capsule Take 2 mg by mouth 4 times daily as needed for DiarrheaHistorical Med      sertraline (ZOLOFT) 50 MG tablet Take 75 mg by mouth nightly Historical Med      vitamin B-12 (CYANOCOBALAMIN) 500 MCG tablet Take 500 mcg by mouth dailyHistorical Med      benazepril (LOTENSIN) 40 MG tablet Take 40 mg by mouth daily Historical Med             Time Spent on discharge is more than 30 mints in the examination, evaluation, counseling and review of medications and discharge plan. Signed:    Barbi Smith MD   4/18/2022      Thank you Rojas Wall MD for the opportunity to be involved in this patient's care. If you have any questions or concerns please feel free to contact me at [phone].

## 2022-06-08 ENCOUNTER — APPOINTMENT (OUTPATIENT)
Dept: CT IMAGING | Age: 87
End: 2022-06-08
Payer: MEDICARE

## 2022-06-08 ENCOUNTER — HOSPITAL ENCOUNTER (EMERGENCY)
Age: 87
Discharge: ANOTHER HOSPITAL | End: 2022-06-08
Attending: EMERGENCY MEDICINE
Payer: MEDICARE

## 2022-06-08 VITALS
DIASTOLIC BLOOD PRESSURE: 59 MMHG | OXYGEN SATURATION: 97 % | TEMPERATURE: 98.7 F | RESPIRATION RATE: 21 BRPM | HEART RATE: 64 BPM | SYSTOLIC BLOOD PRESSURE: 165 MMHG

## 2022-06-08 DIAGNOSIS — S01.01XA LACERATION OF SCALP, INITIAL ENCOUNTER: ICD-10-CM

## 2022-06-08 DIAGNOSIS — S81.811A NONINFECTED SKIN TEAR OF RIGHT LOWER EXTREMITY, INITIAL ENCOUNTER: ICD-10-CM
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
## 2022-06-08 DIAGNOSIS — S22.42XA CLOSED FRACTURE OF MULTIPLE RIBS OF LEFT SIDE, INITIAL ENCOUNTER: ICD-10-CM

## 2022-06-08 DIAGNOSIS — S09.90XA CLOSED HEAD INJURY, INITIAL ENCOUNTER: Primary | ICD-10-CM

## 2022-06-08 LAB
A/G RATIO: 0.9 (ref 1.1–2.2)
ALBUMIN SERPL-MCNC: 2.4 G/DL (ref 3.4–5)
ALP BLD-CCNC: 110 U/L (ref 40–129)
ALT SERPL-CCNC: 13 U/L (ref 10–40)
ANION GAP SERPL CALCULATED.3IONS-SCNC: 10 MMOL/L (ref 3–16)
AST SERPL-CCNC: 25 U/L (ref 15–37)
BASOPHILS ABSOLUTE: 0.1 K/UL (ref 0–0.2)
BASOPHILS RELATIVE PERCENT: 0.4 %
BILIRUB SERPL-MCNC: 0.4 MG/DL (ref 0–1)
BUN BLDV-MCNC: 13 MG/DL (ref 7–20)
CALCIUM SERPL-MCNC: 8.2 MG/DL (ref 8.3–10.6)
CHLORIDE BLD-SCNC: 107 MMOL/L (ref 99–110)
CO2: 19 MMOL/L (ref 21–32)
CREAT SERPL-MCNC: 0.9 MG/DL (ref 0.6–1.2)
EKG ATRIAL RATE: 62 BPM
EKG DIAGNOSIS: NORMAL
EKG P AXIS: 90 DEGREES
EKG P-R INTERVAL: 140 MS
EKG Q-T INTERVAL: 460 MS
EKG QRS DURATION: 82 MS
EKG QTC CALCULATION (BAZETT): 466 MS
EKG R AXIS: 47 DEGREES
EKG T AXIS: 58 DEGREES
EKG VENTRICULAR RATE: 62 BPM
EOSINOPHILS ABSOLUTE: 0.1 K/UL (ref 0–0.6)
EOSINOPHILS RELATIVE PERCENT: 0.8 %
GFR AFRICAN AMERICAN: >60
GFR NON-AFRICAN AMERICAN: 58
GLUCOSE BLD-MCNC: 94 MG/DL (ref 70–99)
HCT VFR BLD CALC: 34.2 % (ref 36–48)
HEMOGLOBIN: 10.8 G/DL (ref 12–16)
LYMPHOCYTES ABSOLUTE: 1.2 K/UL (ref 1–5.1)
LYMPHOCYTES RELATIVE PERCENT: 8.6 %
MCH RBC QN AUTO: 28.1 PG (ref 26–34)
MCHC RBC AUTO-ENTMCNC: 31.6 G/DL (ref 31–36)
MCV RBC AUTO: 89 FL (ref 80–100)
MONOCYTES ABSOLUTE: 0.4 K/UL (ref 0–1.3)
MONOCYTES RELATIVE PERCENT: 2.9 %
NEUTROPHILS ABSOLUTE: 12.5 K/UL (ref 1.7–7.7)
NEUTROPHILS RELATIVE PERCENT: 87.3 %
PDW BLD-RTO: 17.8 % (ref 12.4–15.4)
PLATELET # BLD: 102 K/UL (ref 135–450)
PMV BLD AUTO: 8 FL (ref 5–10.5)
POTASSIUM REFLEX MAGNESIUM: 3.8 MMOL/L (ref 3.5–5.1)
RBC # BLD: 3.84 M/UL (ref 4–5.2)
SODIUM BLD-SCNC: 136 MMOL/L (ref 136–145)
TOTAL PROTEIN: 5.1 G/DL (ref 6.4–8.2)
WBC # BLD: 14.3 K/UL (ref 4–11)

## 2022-06-08 PROCEDURE — 90715 TDAP VACCINE 7 YRS/> IM: CPT | Performed by: NURSE PRACTITIONER

## 2022-06-08 PROCEDURE — 12001 RPR S/N/AX/GEN/TRNK 2.5CM/<: CPT

## 2022-06-08 PROCEDURE — 70450 CT HEAD/BRAIN W/O DYE: CPT

## 2022-06-08 PROCEDURE — 36415 COLL VENOUS BLD VENIPUNCTURE: CPT

## 2022-06-08 PROCEDURE — 85025 COMPLETE CBC W/AUTO DIFF WBC: CPT

## 2022-06-08 PROCEDURE — 6360000002 HC RX W HCPCS: Performed by: NURSE PRACTITIONER

## 2022-06-08 PROCEDURE — 99285 EMERGENCY DEPT VISIT HI MDM: CPT

## 2022-06-08 PROCEDURE — 72125 CT NECK SPINE W/O DYE: CPT

## 2022-06-08 PROCEDURE — 93010 ELECTROCARDIOGRAM REPORT: CPT | Performed by: INTERNAL MEDICINE

## 2022-06-08 PROCEDURE — 6370000000 HC RX 637 (ALT 250 FOR IP): Performed by: NURSE PRACTITIONER

## 2022-06-08 PROCEDURE — 93005 ELECTROCARDIOGRAM TRACING: CPT | Performed by: EMERGENCY MEDICINE

## 2022-06-08 PROCEDURE — 90471 IMMUNIZATION ADMIN: CPT | Performed by: NURSE PRACTITIONER

## 2022-06-08 PROCEDURE — 71250 CT THORAX DX C-: CPT

## 2022-06-08 PROCEDURE — 80053 COMPREHEN METABOLIC PANEL: CPT

## 2022-06-08 RX ORDER — ACETAMINOPHEN 500 MG
1000 TABLET ORAL ONCE
Status: COMPLETED | OUTPATIENT
Start: 2022-06-08 | End: 2022-06-08

## 2022-06-08 RX ADMIN — ACETAMINOPHEN 1000 MG: 500 TABLET ORAL at 04:29

## 2022-06-08 RX ADMIN — TETANUS TOXOID, REDUCED DIPHTHERIA TOXOID AND ACELLULAR PERTUSSIS VACCINE, ADSORBED 0.5 ML: 5; 2.5; 8; 8; 2.5 SUSPENSION INTRAMUSCULAR at 04:30

## 2022-06-08 ASSESSMENT — PAIN SCALES - GENERAL: PAINLEVEL_OUTOF10: 6

## 2022-06-08 NOTE — ED PROVIDER NOTES
[facility]    Physician Attestation    Pt Name: Todd Yi  MRN: [id number]  [place] 5/30/1927  Date of evaluation: 6/8/22        Physician Note:    I havepersonally performed and/or participated in the history, exam and medical decision making and agree with all pertinent clinical information. I have also reviewed and agree with the past medical, family and social historyunless otherwise noted. I have personally performed a face to face diagnostic evaluation onthis patient. I have reviewed the mid-levels findings and agree. History: Tripped and fell to the floor she complains of mild pain of her head and some pain over the left ribs      REVIEW OF SYSTEMS    Constitutional:  Denies fever, chills, or weakness   Eyes:  Denies vision changes  HENT:  Denies sore throat or neck pain   Respiratory:  Denies cough or shortness of breath   Cardiovascular:  Denies chest pain  GI:  Denies abdominal pain, nausea, vomiting, or diarrhea   Musculoskeletal:  Denies back pain   Skin: no rash or vesicles   Neurologic:  no headache weakness focal    Lymphatic:  no swollen  nodes   Psychiatric: no si or hs thoughts     All systems negative except as marked. General Appearance:  Alert, cooperative, no distress, appears stated age. Head:  Normocephalic, without obvious abnormality, hematoma with 1 cm laceration of the right occiput   Eyes:  conjunctiva/corneas clear, EOM's intact. Sclera anicteric. ENT: Mucous membranes moist.   Neck: Supple, symmetrical, trachea midline, no adenopathy. No jugular venous distention. Lungs:   No Respiratory Distress. no rales  rhonchi rub   Chest Wall:  Nontender  no deformity patient is tender at the left costochondral region  No  crepitance   Heart:  Rsr no murmer gallop    Abdomen:   Soft nontender no organomegally    Extremities:  Full range of motion. no deformity   Pulses: Equal  upper and lower    Skin: No rashes or lesions to exposed skin. 2 cm skin tear of the right shin   Neurologic: Alert and oriented X 3. Motor grossly normal.  Speech clear. EKG Interpretation    Interpreted by emergency department physician    Rhythm: normal sinus   Rate: normal  Axis: normal  Ectopy:  premature atrial contraction  Conduction: normal  ST Segments: no acute change  T Waves: no acute change  Q Waves: none    Clinical Impression: Normal sinus rhythm    Alfie Ch MD  CTA of head and neck are unremarkable CT of the chest shows multiple rib fractures left side anterior ribs 6 7 and 8 due to her age and multiple rib fractures she will be observed at the trauma center  Vital signs are stable patient sats well without supplemental oxygen discussed with Dr. Vicki Vincent emergency department they accept the patient she will be transferred to their trauma service    1. Closed head injury, initial encounter    2. Laceration of scalp, initial encounter    3. Noninfected skin tear of right lower extremity, initial encounter    4. Closed fracture of multiple ribs of left side, initial encounter          DISPOSITION/PLAN  PATIENT REFERRED TO:  No follow-up provider specified. DISCHARGE MEDICATIONS:  New Prescriptions    No medications on file         Is this patient to be included in the SEP-1 Core Measure due to severe sepsis or septic shock? No   Exclusion criteria - the patient is NOT to be included for SEP-1 Core Measure due to:   Infection is not suspected      MD Alfie Escamilla MD  06/08/22 6013

## 2022-06-08 NOTE — ED PROVIDER NOTES
PROCEDURE NOTE    I was asked by the attending physician, Mariel Gordon, to evaluate this patient for wound repair    Lac Repair    Date/Time: 6/8/2022 3:31 AM  Performed by: RODRIGO Conroy CNP  Authorized by: Tesha Chi MD     Consent:     Consent obtained:  Verbal    Consent given by:  Patient    Risks discussed:  Pain  Laceration details:     Location:  Scalp    Scalp location:  Occipital    Length (cm):  2  Repair type:     Repair type:  Simple  Pre-procedure details:     Preparation:  Patient was prepped and draped in usual sterile fashion  Exploration:     Wound exploration: wound explored through full range of motion    Treatment:     Area cleansed with:  Hibiclens    Amount of cleaning:  Extensive    Irrigation solution:  Sterile saline    Irrigation method:  Pressure wash  Skin repair:     Repair method:  Staples    Number of staples:  2  Approximation:     Approximation:  Close  Post-procedure details:     Dressing:  Open (no dressing)    Patient tolerance of procedure:   Tolerated well, no immediate complications           RODRIGO Conroy CNP  06/08/22 [address] [place] [address] RODRIGO Mckinney CNP  06/08/22 [id number]

## 2022-06-08 NOTE — ED NOTES
Report called to J Luis Lemon RN at [facility] transporting pt.       Ryanne Mota RN  06/08/22 0481

## 2023-01-15 ENCOUNTER — APPOINTMENT (OUTPATIENT)
Dept: CT IMAGING | Age: 88
End: 2023-01-15
Payer: MEDICARE

## 2023-01-15 ENCOUNTER — APPOINTMENT (OUTPATIENT)
Dept: GENERAL RADIOLOGY | Age: 88
End: 2023-01-15
Payer: MEDICARE

## 2023-01-15 ENCOUNTER — HOSPITAL ENCOUNTER (EMERGENCY)
Age: 88
Discharge: HOME | End: 2023-01-15
Payer: MEDICARE

## 2023-01-15 VITALS
HEIGHT: 65 IN | WEIGHT: 147.05 LBS | TEMPERATURE: 97.6 F | SYSTOLIC BLOOD PRESSURE: 141 MMHG | RESPIRATION RATE: 14 BRPM | HEART RATE: 88 BPM | DIASTOLIC BLOOD PRESSURE: 82 MMHG | OXYGEN SATURATION: 96 % | BODY MASS INDEX: 24.5 KG/M2

## 2023-01-15 DIAGNOSIS — N39.0 URINARY TRACT INFECTION WITHOUT HEMATURIA, SITE UNSPECIFIED: Primary | ICD-10-CM

## 2023-01-15 LAB
A/G RATIO: 1.4 (ref 1.1–2.2)
ALBUMIN SERPL-MCNC: 3.8 G/DL (ref 3.4–5)
ALP BLD-CCNC: 92 U/L (ref 40–129)
ALT SERPL-CCNC: 16 U/L (ref 10–40)
ANION GAP SERPL CALCULATED.3IONS-SCNC: 14 MMOL/L (ref 3–16)
AST SERPL-CCNC: 17 U/L (ref 15–37)
BACTERIA: ABNORMAL /HPF
BASOPHILS ABSOLUTE: 0 K/UL (ref 0–0.2)
BASOPHILS RELATIVE PERCENT: 0.5 %
BILIRUB SERPL-MCNC: <0.2 MG/DL (ref 0–1)
BILIRUBIN URINE: NEGATIVE
BLOOD, URINE: NEGATIVE
BUN BLDV-MCNC: 22 MG/DL (ref 7–20)
CALCIUM SERPL-MCNC: 9.2 MG/DL (ref 8.3–10.6)
CHLORIDE BLD-SCNC: 103 MMOL/L (ref 99–110)
CLARITY: CLEAR
CO2: 23 MMOL/L (ref 21–32)
COLOR: YELLOW
CREAT SERPL-MCNC: 1.1 MG/DL (ref 0.6–1.2)
EOSINOPHILS ABSOLUTE: 0.1 K/UL (ref 0–0.6)
EOSINOPHILS RELATIVE PERCENT: 1.5 %
EPITHELIAL CELLS, UA: 3 /HPF (ref 0–5)
GFR SERPL CREATININE-BSD FRML MDRD: 46 ML/MIN/{1.73_M2}
GLUCOSE BLD-MCNC: 136 MG/DL (ref 70–99)
GLUCOSE URINE: NEGATIVE MG/DL
HCT VFR BLD CALC: 37.7 % (ref 36–48)
HEMOGLOBIN: 11.9 G/DL (ref 12–16)
HYALINE CASTS: 6 /LPF (ref 0–8)
KETONES, URINE: ABNORMAL MG/DL
LEUKOCYTE ESTERASE, URINE: ABNORMAL
LYMPHOCYTES ABSOLUTE: 1.4 K/UL (ref 1–5.1)
LYMPHOCYTES RELATIVE PERCENT: 16.4 %
MCH RBC QN AUTO: 29.8 PG (ref 26–34)
MCHC RBC AUTO-ENTMCNC: 31.7 G/DL (ref 31–36)
MCV RBC AUTO: 94.1 FL (ref 80–100)
MICROSCOPIC EXAMINATION: YES
MONOCYTES ABSOLUTE: 0.4 K/UL (ref 0–1.3)
MONOCYTES RELATIVE PERCENT: 4.5 %
NEUTROPHILS ABSOLUTE: 6.7 K/UL (ref 1.7–7.7)
NEUTROPHILS RELATIVE PERCENT: 77.1 %
NITRITE, URINE: NEGATIVE
PDW BLD-RTO: 15.1 % (ref 12.4–15.4)
PH UA: 5.5 (ref 5–8)
PLATELET # BLD: 210 K/UL (ref 135–450)
PMV BLD AUTO: 8.9 FL (ref 5–10.5)
POTASSIUM REFLEX MAGNESIUM: 4.2 MMOL/L (ref 3.5–5.1)
PROTEIN UA: NEGATIVE MG/DL
RBC # BLD: 4 M/UL (ref 4–5.2)
RBC UA: 1 /HPF (ref 0–4)
SODIUM BLD-SCNC: 140 MMOL/L (ref 136–145)
SPECIFIC GRAVITY UA: 1.02 (ref 1–1.03)
TOTAL PROTEIN: 6.5 G/DL (ref 6.4–8.2)
URINE REFLEX TO CULTURE: YES
URINE TYPE: ABNORMAL
UROBILINOGEN, URINE: 0.2 E.U./DL
WBC # BLD: 8.7 K/UL (ref 4–11)
WBC UA: 20 /HPF (ref 0–5)

## 2023-01-15 PROCEDURE — 85025 COMPLETE CBC W/AUTO DIFF WBC: CPT

## 2023-01-15 PROCEDURE — 81001 URINALYSIS AUTO W/SCOPE: CPT

## 2023-01-15 PROCEDURE — 96372 THER/PROPH/DIAG INJ SC/IM: CPT

## 2023-01-15 PROCEDURE — 87086 URINE CULTURE/COLONY COUNT: CPT

## 2023-01-15 PROCEDURE — 6360000002 HC RX W HCPCS: Performed by: NURSE PRACTITIONER

## 2023-01-15 PROCEDURE — 80053 COMPREHEN METABOLIC PANEL: CPT

## 2023-01-15 PROCEDURE — 99284 EMERGENCY DEPT VISIT MOD MDM: CPT

## 2023-01-15 PROCEDURE — 70450 CT HEAD/BRAIN W/O DYE: CPT

## 2023-01-15 PROCEDURE — 71045 X-RAY EXAM CHEST 1 VIEW: CPT

## 2023-01-15 RX ORDER — FERROUS SULFATE 325(65) MG
325 TABLET ORAL
COMMUNITY

## 2023-01-15 RX ORDER — BUSPIRONE HYDROCHLORIDE 7.5 MG/1
7.5 TABLET ORAL 3 TIMES DAILY
COMMUNITY

## 2023-01-15 RX ORDER — CEFTRIAXONE 500 MG/1
500 INJECTION, POWDER, FOR SOLUTION INTRAMUSCULAR; INTRAVENOUS ONCE
Status: COMPLETED | OUTPATIENT
Start: 2023-01-15 | End: 2023-01-15

## 2023-01-15 RX ORDER — CEFUROXIME AXETIL 250 MG/1
250 TABLET ORAL 2 TIMES DAILY
Qty: 14 TABLET | Refills: 0 | Status: SHIPPED | OUTPATIENT
Start: 2023-01-15 | End: 2023-01-22

## 2023-01-15 RX ORDER — ACETAMINOPHEN 325 MG/1
325 TABLET ORAL EVERY 8 HOURS PRN
COMMUNITY

## 2023-01-15 RX ADMIN — CEFTRIAXONE SODIUM 500 MG: 500 INJECTION, POWDER, FOR SOLUTION INTRAMUSCULAR; INTRAVENOUS at 13:12

## 2023-01-15 ASSESSMENT — ENCOUNTER SYMPTOMS
SHORTNESS OF BREATH: 0
VOMITING: 0
EYE PAIN: 0
BACK PAIN: 0
DIARRHEA: 0
SORE THROAT: 0
NAUSEA: 0
ABDOMINAL PAIN: 0
COUGH: 0

## 2023-01-15 ASSESSMENT — PAIN - FUNCTIONAL ASSESSMENT
PAIN_FUNCTIONAL_ASSESSMENT: NONE - DENIES PAIN
PAIN_FUNCTIONAL_ASSESSMENT: NONE - DENIES PAIN

## 2023-01-15 ASSESSMENT — LIFESTYLE VARIABLES: HOW OFTEN DO YOU HAVE A DRINK CONTAINING ALCOHOL: NEVER

## 2023-01-15 NOTE — ED PROVIDER NOTES
[address] [facility]        Pt Name: Cristobal Ayoub  MRN: [id number]  [place] 5/30/1927  Date of evaluation: 1/15/2023  Provider: Eulogio Doyle, APRN - CNP  PCP: Yemi Pimentel MD  Note Started: 11:09 AM EST 1/15/23      LIBORIO. I have evaluated this patient. My supervising physician was available for consultation. CHIEF COMPLAINT       Chief Complaint   Patient presents with    Agitation     Pt from [facility]. Hx of dementia that has gotten worse over last couple months, hx of UTI. Recent covid +  Agitation and combative starting yesterday. FSBS 210, VSS       HISTORY OF PRESENT ILLNESS: 1 or more Elements     History From: EMS run, family at bedside and ECF  Limitations to history : dementia    Cristobal Ayoub is a 80 y.o. female who presents to the ED from [facility]. She has been becoming more agitated over the past 48 hours. No other symptoms. Patient is not complaining of any issues. She states that she feels fine. Wants to go home the second I walked into the door she asked if she could go back home. According to [facility] staff she did test positive for COVID Tuesday. However she does have a history of UTIs with similar presentation and therefore sent her in for UTI work-up. Nursing Notes were all reviewed and agreed with or any disagreements were addressed in the HPI. REVIEW OF SYSTEMS :      Review of Systems   Constitutional:  Negative for chills and fever. HENT:  Negative for congestion and sore throat. Eyes:  Negative for pain and visual disturbance. Respiratory:  Negative for cough and shortness of breath. Cardiovascular:  Negative for chest pain and leg swelling. Gastrointestinal:  Negative for abdominal pain, diarrhea, nausea and vomiting. Genitourinary:  Negative for dysuria and hematuria. Musculoskeletal:  Negative for back pain and neck pain. Skin:  Negative for rash and wound.    Neurological:  Negative for dizziness and light-headedness. Positives and Pertinent negatives as per HPI. SURGICAL HISTORY     Past Surgical History:   Procedure Laterality Date    CHOLECYSTECTOMY      INCISION AND DRAINAGE Left 6/25/2019    INCISION AND DRAINAGE LEFT KNEE performed by Juan Dugan MD at [address] [place]  Right 06/09/2016    incarcerated spigelian IHR with mesh       CURRENTMEDICATIONS       Discharge Medication List as of 1/15/2023  1:06 PM        CONTINUE these medications which have NOT CHANGED    Details   busPIRone (BUSPAR) 7.5 MG tablet Take 7.5 mg by mouth 3 times dailyHistorical Med      ferrous sulfate (IRON 325) 325 (65 Fe) MG tablet Take 325 mg by mouth daily (with breakfast)Historical Med      Nirmatrelvir-Ritonavir (PAXLOVID, 300/100, PO) Take by mouth in the morning and at bedtime 3 tablets by mouth twice a day for 5 daysHistorical Med      acetaminophen (TYLENOL) 325 MG tablet Take 325 mg by mouth every 8 hours as needed for PainHistorical Med      pantoprazole (PROTONIX) 40 MG tablet Take 40 mg by mouth dailyHistorical Med      hydrocortisone (ANUSOL-HC) 2.5 % CREA rectal cream Place rectally 4 times daily as needed for Hemorrhoids Apply topically. , Rectal, 4 TIMES DAILY PRN, Historical Med      witch hazel-glycerin (TUCKS) pad Place rectally as needed for Hemorrhoids Place rectally as needed., Rectal, PRN, Historical Med      amLODIPine (NORVASC) 10 MG tablet Take 10 mg by mouth dailyHistorical Med      aspirin 81 MG EC tablet Take 81 mg by mouth dailyHistorical Med      cloNIDine (CATAPRES) 0.1 MG tablet Take 0.1 mg by mouth 2 times dailyHistorical Med      traZODone (DESYREL) 50 MG tablet Take 50 mg by mouth nightly Take 1/2 tablet. May increase to full tablet if needed to promote sleep. Historical Med      docusate sodium (COLACE) 100 MG capsule Take 100 mg by mouth daily as needed for ConstipationHistorical Med      loperamide (IMODIUM) 2 MG capsule Take 2 mg by mouth 4 times daily as needed for DiarrheaHistorical Med      sertraline (ZOLOFT) 50 MG tablet Take 50 mg by mouth nightlyHistorical Med      benazepril (LOTENSIN) 40 MG tablet Take 40 mg by mouth daily Historical Med             ALLERGIES     Patient has no known allergies. FAMILYHISTORY     History reviewed. No pertinent family history. SOCIAL HISTORY       Social History     Tobacco Use    Smoking status: Never    Smokeless tobacco: Never   Substance Use Topics    Alcohol use: Yes     Comment: occ    Drug use: No       SCREENINGS        [place] Coma Scale  Eye Opening: Spontaneous  Best Verbal Response: Confused  Best Motor Response: Obeys commands  [place] Coma Scale Score: 14                CIWA Assessment  BP: (!) 141/82  Heart Rate: 88           PHYSICAL EXAM  1 or more Elements     ED Triage Vitals   BP Temp Temp src Pulse Resp SpO2 Height Weight   -- -- -- -- -- -- -- --       Physical Exam  Vitals and nursing note reviewed. Constitutional:       General: She is not in acute distress. Appearance: Normal appearance. She is not ill-appearing, toxic-appearing or diaphoretic. HENT:      Head: Normocephalic and atraumatic. No raccoon eyes, Oseguera's sign, right periorbital erythema or left periorbital erythema. Right Ear: Hearing and external ear normal.      Left Ear: Hearing and external ear normal.      Ears:      Comments: No hemotympanum bilaterally     Nose: Nose normal. No laceration, nasal tenderness, mucosal edema, congestion or rhinorrhea. Right Nostril: No epistaxis. Left Nostril: No epistaxis. Mouth/Throat:      Lips: Pink. No lesions. Mouth: Mucous membranes are moist.      Tongue: No lesions. Tongue does not deviate from midline. Pharynx: Oropharynx is clear. Uvula midline. No pharyngeal swelling, oropharyngeal exudate, posterior oropharyngeal erythema or uvula swelling. Tonsils: No tonsillar exudate or tonsillar abscesses.    Eyes:      General: Lids are normal. Right eye: No discharge. Left eye: No discharge. Extraocular Movements: Extraocular movements intact. Pupils: Pupils are equal, round, and reactive to light. Neck:      Trachea: Phonation normal. No abnormal tracheal secretions or tracheal deviation. Comments: No meningismus   Cardiovascular:      Rate and Rhythm: Normal rate and regular rhythm. Pulses: Normal pulses. Heart sounds: Normal heart sounds. No murmur heard. No friction rub. No gallop. Pulmonary:      Effort: Pulmonary effort is normal. No respiratory distress. Breath sounds: Normal breath sounds. No stridor. No wheezing, rhonchi or rales. Chest:      Chest wall: No tenderness. Abdominal:      General: Bowel sounds are normal. There is no distension. Palpations: Abdomen is soft. There is no mass. Tenderness: There is no abdominal tenderness. There is no right CVA tenderness, left CVA tenderness, guarding or rebound. Hernia: No hernia is present. Musculoskeletal:         General: Normal range of motion. Cervical back: Full passive range of motion without pain, normal range of motion and neck supple. No rigidity. No spinous process tenderness or muscular tenderness. Right lower leg: No edema. Left lower leg: No edema. Lymphadenopathy:      Cervical: No cervical adenopathy. Skin:     General: Skin is warm and dry. Capillary Refill: Capillary refill takes less than 2 seconds. Neurological:      General: No focal deficit present. Mental Status: She is alert and oriented to person, place, and time. Mental status is at baseline. GCS: GCS eye subscore is 4. GCS verbal subscore is 5. GCS motor subscore is 6. Cranial Nerves: No cranial nerve deficit, dysarthria or facial asymmetry. Sensory: Sensation is intact. No sensory deficit. Motor: Motor function is intact. No weakness. Coordination: Coordination normal.      Gait: Gait is intact. Gait normal.      Comments: Alert to self and place. Disoriented to time and situation   Psychiatric:         Mood and Affect: Mood normal.         Behavior: Behavior normal.      Comments: I do not note any agitation or aggression       DIAGNOSTIC RESULTS   LABS:    Labs Reviewed   URINALYSIS WITH REFLEX TO CULTURE - Abnormal; Notable for the following components:       Result Value    Ketones, Urine TRACE (*)     Leukocyte Esterase, Urine SMALL (*)     All other components within normal limits   CBC WITH AUTO DIFFERENTIAL - Abnormal; Notable for the following components:    Hemoglobin 11.9 (*)     All other components within normal limits   COMPREHENSIVE METABOLIC PANEL W/ REFLEX TO MG FOR LOW K - Abnormal; Notable for the following components:    Glucose 136 (*)     BUN 22 (*)     Est, Glom Filt Rate 46 (*)     All other components within normal limits   MICROSCOPIC URINALYSIS - Abnormal; Notable for the following components:    Bacteria, UA 3+ (*)     WBC, UA 20 (*)     All other components within normal limits   CULTURE, URINE       When ordered only abnormal lab results are displayed. All other labs were within normal range or not returned as of this dictation. EKG: When ordered, EKG's are interpreted by the Emergency Department Physician in the absence of a cardiologist.  Please see their note for interpretation of EKG. RADIOLOGY:   Non-plain film images such as CT, Ultrasound and MRI are read by the radiologist. Plain radiographic images are visualized and preliminarily interpreted by the ED Provider with the below findings:    I do not note any areas of focal consolidation consistent with pneumonia    Interpretation per the Radiologist below, if available at the time of this note:    XR CHEST PORTABLE   Final Result   No radiographic evidence of acute pulmonary disease. CT Head W/O Contrast   Final Result   No acute intracranial abnormality. White matter small vessel disease.   Mild cerebral atrophy. No results found. No results found. PROCEDURES   Unless otherwise noted below, none     Procedures    CRITICAL CARE TIME (.cctime)   CRITICAL CARE NOTE:    Rosangela Medley CNP am the primary clinician of record. There was a high probability of clinically significant life-threatening deterioration of the patient's condition requiring my urgent intervention. Total critical care time was at least 15 minutes. Of nonconcurrent critical care time. This includes vital sign monitoring, pulse oximetry monitoring, telemetry monitoring, clinical response to the IV medications, reviewing the nursing notes, consultation time, dictation/documentation time, and interpretation of the labwork. This excludes any separately billable procedures performed. PAST MEDICAL HISTORY      has a past medical history of Arthritis, Hyperlipidemia, and Hypertension. EMERGENCY DEPARTMENT COURSE and DIFFERENTIAL DIAGNOSIS/MDM:   Vitals:    Vitals:    01/15/23 1215 01/15/23 1245 01/15/23 1300 01/15/23 1315   BP: (!) 152/81 134/76 (!) 141/82 (!) 141/82   Pulse:    88   Resp:    14   Temp:       TempSrc:       SpO2: 100% 97% 97% 96%   Weight:       Height:           Patient was given the following medications:  Medications   cefTRIAXone (ROCEPHIN) injection 500 mg (500 mg IntraMUSCular Given 1/15/23 1312)             Is this patient to be included in the SEP-1 Core Measure due to severe sepsis or septic shock? No   Exclusion criteria - the patient is NOT to be included for SEP-1 Core Measure due to:  2+ SIRS criteria are not met    Chronic Conditions affecting care: dementia   has a past medical history of Arthritis, Hyperlipidemia, and Hypertension. CONSULTS: (Who and What was discussed)  None    Social Determinants : None    Records Reviewed (Source): EMS report, and paperwork that ECF provided that is at bedside. It does show recent COVID test that was positive on Tuesday.     CC/HPI Summary, DDx, ED Course, and Reassessment: See HPI and above for full presentation and physical exam.    Patient is a 80-year-old female the presents to the ED via EMS from a ECF. She has had worsening dementia over the past several months. However over the past few days she has been grown more agitated and combative. Patient has no complaints on arrival.  She states \"I feel fine. \"  When asked why she is here she states \"they wanted me to check for something but I do not even know why. \"  She has no abdominal pain. No chest pain. No shortness of breath. She denies any recent nausea vomiting or diarrhea. Denies any urinary complaints such as dysuria, gross hematuria, urinary urgency or frequency. She states that she wants to go home. She is afebrile and hemodynamically stable. In no acute distress. No conversational dyspnea. No hypoxia. She knows she is in the hospital.  Disoriented somewhat otherwise to date. However is oriented to person. States that she would be preferred to be called [place] as this is her middle name and she goes by her middle name. Her abdomen is soft, not acute . , nontender and bowel sounds are present. Cardiac RRR. Lung sounds are clear. The patient is unsure of why she is here, report from EMS was that she is grown more agitated over the past couple of days. However she has been tested recently positive for COVID. Likely was sent in for rule out UTI or bacterial infection superimposed on a viral infection. Therefore we will obtain basic blood work, urine sample as well as a chest x-ray. Will reevaluate. Differential diagnoses included but not limited to advancing dementia, intracranial hemorrhage, intracranial mass, intracranial emergency, UTI, electrolyte derangement, dehydration, FERNANDA, bacterial pneumonia, viral pneumonia, viral encephalopathy, other    Patient's work-up is reassuring. She has no leukocytosis. No significant anemia, hemoglobin is stable at 11.9.  No thrombocytopenia. No significant electrolyte derangements or FERNANDA. She does have a small amount of leukocytes on her urine, 3+ bacteria and 20 WBCs on the micro UA with only 3 epi cells's consistent with UTI. We will send for culture and sensitivity. We will give her an IM injection of Rocephin here and start her on a course of Ceftin p.o. Chest x-ray and head CT is read by the radiologist as above. I do believe patient stable for discharge home. She is at her mental status baseline, afebrile and hemodynamically stable. No signs of sepsis or SIRS. Likely a combination between a viral COVID infection and her UTI causing the increased agitation over the past few days. However she has not been agitated or aggressive here in the ED and therefore I do believe that is reasonable for her to be discharged back to the ECF. Daughter at bedside and is in agreement. Disposition Considerations (tests considered but not done, Admit vs D/C, Shared Decision Making, Pt Expectation of Test or Tx.): I did consider possible admission for the increase in agitation and mental status over the past 48 hours but she is watched closely at the ECF, and she has no signs of sirs or sepsis. This is her typical presentation with a UTI. Therefore I do believe that it is reasonable to be discharged home to the [facility] with p.o. antibiotics and strict return parameters. I am the Primary Clinician of Record. FINAL IMPRESSION      1.  Urinary tract infection without hematuria, site unspecified          DISPOSITION/PLAN     DISPOSITION Decision To Discharge 01/15/2023 12:52:52 PM      PATIENT [place]ED [place]  [id number] [address] [address]  [facility] [address]  [phone]    Schedule an appointment as soon as possible for a visit in 2 days  OR your primary provider at the care facility    [facility] Emergency Department  [address] [address] [phone]  Go to   As needed, If symptoms worsen    DISCHARGE MEDICATIONS:  Discharge Medication List as of 1/15/2023  1:06 PM        START taking these medications    Details   cefUROXime (CEFTIN) 250 MG tablet Take 1 tablet by mouth 2 times daily for 7 days, Disp-14 tablet, R-0Print             DISCONTINUED MEDICATIONS:  Discharge Medication List as of 1/15/2023  1:06 PM        STOP taking these medications       lactobacillus (CULTURELLE) capsule Comments:   Reason for Stopping:         melatonin 5 MG TABS tablet Comments:   Reason for Stopping:         vitamin B-12 (CYANOCOBALAMIN) 500 MCG tablet Comments:   Reason for Stopping:                      (Please note that portions of this note were completed with a voice recognition program.  Efforts were made to edit the dictations but occasionally words are mis-transcribed.)    RODRIGO Ham CNP (electronically signed)        RODRIGO Ham CNP  01/15/23 6100

## 2023-01-16 LAB — URINE CULTURE, ROUTINE: NORMAL

## (undated) DEVICE — PAD,ABDOMINAL,8"X7.5",STERILE,LF,1/PK: Brand: MEDLINE

## (undated) DEVICE — GLOVE SURG SZ 6 L12IN FNGR THK87MIL DK GRN LTX FREE ISOLEX

## (undated) DEVICE — GOWN SIRUS NONREIN LG W/TWL: Brand: MEDLINE INDUSTRIES, INC.

## (undated) DEVICE — SHEET,DRAPE,53X77,STERILE: Brand: MEDLINE

## (undated) DEVICE — SUTURE VCRL SZ 3-0 L18IN ABSRB UD L26MM SH 1/2 CIR J864D

## (undated) DEVICE — CANISTER, RIGID, 1200CC: Brand: MEDLINE INDUSTRIES, INC.

## (undated) DEVICE — SPONGE GZ W4XL4IN COT 12 PLY TYP VII WVN C FLD DSGN

## (undated) DEVICE — BANDAGE COMPR W4INXL15FT BGE E SGL LAYERED CLP CLSR

## (undated) DEVICE — SUTURE VCRL SZ 0 L18IN ABSRB UD L36MM CT-1 1/2 CIR J840D

## (undated) DEVICE — 3M™ COBAN™ NL STERILE NON-LATEX SELF-ADHERENT WRAP, 2086S, 6 IN X 5 YD (15 CM X 4,5 M), 12 ROLLS/CASE: Brand: 3M™ COBAN™

## (undated) DEVICE — KIT OR ROOM TURNOVER W/STRAP

## (undated) DEVICE — MAJOR SET UP PK

## (undated) DEVICE — BANDAGE COMPR W6INXL12FT SMOOTH FOR LIMB EXSANG ESMARCH

## (undated) DEVICE — DRAPE,U/SHT,SPLIT,FILM,60X84,STERILE: Brand: MEDLINE

## (undated) DEVICE — Z DISCONTINUED USE 2275686 GLOVE SURG SZ 8 L12IN FNGR THK13MIL WHT ISOLEX POLYISOPRENE

## (undated) DEVICE — T-DRAPE,EXTREMITY,STERILE: Brand: MEDLINE

## (undated) DEVICE — SYRINGE MED 10ML LUERLOCK TIP W/O SFTY DISP

## (undated) DEVICE — BANDAGE COMPR W6INXL10YD ST M E WHITE/BEIGE

## (undated) DEVICE — GOWN SIRUS NONREIN XL W/TWL: Brand: MEDLINE INDUSTRIES, INC.

## (undated) DEVICE — GLOVE SURG SZ 6 THK91MIL LTX FREE SYN POLYISOPRENE ANTI

## (undated) DEVICE — COTTON UNDERCAST PADDING,CRIMPED FINISH: Brand: WEBRIL

## (undated) DEVICE — SPONGE LAP W18XL18IN WHT COT 4 PLY FLD STRUNG RADPQ DISP ST

## (undated) DEVICE — 19 IN KNEE IMMOBILIZER: Brand: DEROYAL

## (undated) DEVICE — GLOVE SURG SZ 8 L12IN FNGR THK87MIL WHT LTX FREE

## (undated) DEVICE — SOLUTION IV IRRIG POUR BRL 0.9% SODIUM CHL 2F7124

## (undated) DEVICE — NEEDLE HYPO 23GA L1.5IN TURQ POLYPR HUB S STL THN WALL IM

## (undated) DEVICE — SUTURE VCRL SZ 2-0 L18IN ABSRB UD CT-1 L36MM 1/2 CIR J839D

## (undated) DEVICE — ELECTRODE PT RET AD L9FT HI MOIST COND ADH HYDRGEL CORDED

## (undated) DEVICE — STOCKINETTE,IMPERVIOUS,12X48,STERILE: Brand: MEDLINE

## (undated) DEVICE — COVER LT HNDL BLU PLAS

## (undated) DEVICE — CHLORAPREP 26ML ORANGE

## (undated) DEVICE — GLOVE SURG SZ 6 L12IN FNGR THK87MIL WHT LTX FREE